# Patient Record
Sex: MALE | Race: WHITE | NOT HISPANIC OR LATINO | Employment: FULL TIME | URBAN - METROPOLITAN AREA
[De-identification: names, ages, dates, MRNs, and addresses within clinical notes are randomized per-mention and may not be internally consistent; named-entity substitution may affect disease eponyms.]

---

## 2017-06-28 ENCOUNTER — NURSE TRIAGE (OUTPATIENT)
Dept: NURSING | Facility: CLINIC | Age: 42
End: 2017-06-28

## 2017-06-28 ENCOUNTER — TELEPHONE (OUTPATIENT)
Dept: FAMILY MEDICINE | Facility: CLINIC | Age: 42
End: 2017-06-28

## 2017-06-28 NOTE — TELEPHONE ENCOUNTER
Patient calling with chest pain, blood pressure issues. Triage not available in clinic, call transferred to FNA as advised.

## 2017-06-28 NOTE — TELEPHONE ENCOUNTER
"  Reason for Disposition    Numbness (i.e., loss of sensation) in hand or fingers    Additional Information    Negative: Shock suspected (e.g., cold/pale/clammy skin, too weak to stand, low BP, rapid pulse)    Negative: [1] Similar pain previously AND [2] it was from \"angina\" AND [3] not relieved by nitroglycerin    Negative: [1] Similar pain previously AND [2] it was from \"heart attack\"    Negative: Sounds like a life-threatening emergency to the triager    Negative: Followed an arm injury    Negative: Chest pain    Negative: Pain, redness, or swelling at intravenous (IV) site or along course of vein    Negative: Wound looks infected    Negative: Elbow pain is main symptom    Negative: Wrist pain is main symptom    Negative: Difficulty breathing or unusual sweating (e.g., sweating without exertion)    Negative: [1] Age > 40 AND [2] associated chest or jaw pain AND [3] pain lasts > 5 minutes    Negative: [1] Age > 40 AND [2] no obvious cause AND [3] pain even when not moving the arm    (Exception: pain is clearly made worse by moving arm or bending neck)    Negative: [1] SEVERE pain AND [2] not improved 2 hours after pain medicine    Negative: [1] Red area or streak AND [2] fever    Negative: [1] Swollen joint AND [2] fever    Negative: Patient sounds very sick or weak to the triager    Negative: [1] Red area or streak AND [2] large (> 2 in. or 5 cm)    Negative: Entire arm is swollen    Negative: [1] Cast on wrist or arm AND [2] now increased pain    Negative: Weakness (i.e., loss of strength) in hand or fingers     (Exception: not truly weak; hand feels weak because of pain)    Negative: [1] Arm pains with exertion (e.g., walking) AND [2] pain goes away on resting AND [3] not present now    Negative: [1] Painful rash AND [2] multiple small blisters grouped together (i.e., dermatomal distribution or \"band\" or \"stripe\")    Negative: Looks like a boil, infected sore, deep ulcer or other infected rash (spreading " redness, pus)    Negative: [1] Localized rash is very painful AND [2] no fever    Negative: Localized pain, redness or hard lump along vein    Protocols used: ARM PAIN-ADULT-AH

## 2017-06-30 ENCOUNTER — RADIANT APPOINTMENT (OUTPATIENT)
Dept: GENERAL RADIOLOGY | Facility: CLINIC | Age: 42
End: 2017-06-30
Attending: NURSE PRACTITIONER
Payer: COMMERCIAL

## 2017-06-30 ENCOUNTER — OFFICE VISIT (OUTPATIENT)
Dept: FAMILY MEDICINE | Facility: CLINIC | Age: 42
End: 2017-06-30
Payer: COMMERCIAL

## 2017-06-30 VITALS
DIASTOLIC BLOOD PRESSURE: 98 MMHG | HEIGHT: 70 IN | WEIGHT: 238.6 LBS | HEART RATE: 69 BPM | BODY MASS INDEX: 34.16 KG/M2 | TEMPERATURE: 98.3 F | OXYGEN SATURATION: 97 % | SYSTOLIC BLOOD PRESSURE: 150 MMHG

## 2017-06-30 DIAGNOSIS — N18.30 CKD (CHRONIC KIDNEY DISEASE) STAGE 3, GFR 30-59 ML/MIN (H): ICD-10-CM

## 2017-06-30 DIAGNOSIS — I10 BENIGN ESSENTIAL HYPERTENSION: ICD-10-CM

## 2017-06-30 DIAGNOSIS — F41.0 PANIC ATTACK: ICD-10-CM

## 2017-06-30 DIAGNOSIS — K21.9 GASTROESOPHAGEAL REFLUX DISEASE WITHOUT ESOPHAGITIS: ICD-10-CM

## 2017-06-30 DIAGNOSIS — E78.5 HYPERLIPIDEMIA LDL GOAL <130: Primary | ICD-10-CM

## 2017-06-30 DIAGNOSIS — R07.9 LEFT SIDED CHEST PAIN: ICD-10-CM

## 2017-06-30 LAB
ANION GAP SERPL CALCULATED.3IONS-SCNC: 8 MMOL/L (ref 3–14)
BUN SERPL-MCNC: 23 MG/DL (ref 7–30)
CALCIUM SERPL-MCNC: 8.8 MG/DL (ref 8.5–10.1)
CHLORIDE SERPL-SCNC: 104 MMOL/L (ref 94–109)
CHOLEST SERPL-MCNC: 317 MG/DL
CO2 SERPL-SCNC: 29 MMOL/L (ref 20–32)
CREAT SERPL-MCNC: 1.65 MG/DL (ref 0.66–1.25)
CREAT UR-MCNC: 67 MG/DL
GFR SERPL CREATININE-BSD FRML MDRD: 46 ML/MIN/1.7M2
GLUCOSE SERPL-MCNC: 70 MG/DL (ref 70–99)
HDLC SERPL-MCNC: 52 MG/DL
HGB BLD-MCNC: 16.1 G/DL (ref 13.3–17.7)
LDLC SERPL CALC-MCNC: 230 MG/DL
MICROALBUMIN UR-MCNC: 682 MG/L
MICROALBUMIN/CREAT UR: 1016.39 MG/G CR (ref 0–17)
NONHDLC SERPL-MCNC: 265 MG/DL
POTASSIUM SERPL-SCNC: 4.3 MMOL/L (ref 3.4–5.3)
SODIUM SERPL-SCNC: 141 MMOL/L (ref 133–144)
TRIGL SERPL-MCNC: 174 MG/DL

## 2017-06-30 PROCEDURE — 85018 HEMOGLOBIN: CPT | Performed by: NURSE PRACTITIONER

## 2017-06-30 PROCEDURE — 36415 COLL VENOUS BLD VENIPUNCTURE: CPT | Performed by: NURSE PRACTITIONER

## 2017-06-30 PROCEDURE — 99214 OFFICE O/P EST MOD 30 MIN: CPT | Performed by: NURSE PRACTITIONER

## 2017-06-30 PROCEDURE — 82043 UR ALBUMIN QUANTITATIVE: CPT | Performed by: NURSE PRACTITIONER

## 2017-06-30 PROCEDURE — 93000 ELECTROCARDIOGRAM COMPLETE: CPT | Performed by: NURSE PRACTITIONER

## 2017-06-30 PROCEDURE — 71101 X-RAY EXAM UNILAT RIBS/CHEST: CPT | Mod: LT

## 2017-06-30 PROCEDURE — 80048 BASIC METABOLIC PNL TOTAL CA: CPT | Performed by: NURSE PRACTITIONER

## 2017-06-30 PROCEDURE — 80061 LIPID PANEL: CPT | Performed by: NURSE PRACTITIONER

## 2017-06-30 RX ORDER — LISINOPRIL 10 MG/1
10 TABLET ORAL DAILY
Qty: 90 TABLET | Refills: 1 | Status: SHIPPED | OUTPATIENT
Start: 2017-06-30 | End: 2017-10-23

## 2017-06-30 RX ORDER — CLONAZEPAM 0.5 MG/1
0.25-0.5 TABLET ORAL 2 TIMES DAILY PRN
Qty: 20 TABLET | Refills: 0 | Status: SHIPPED | OUTPATIENT
Start: 2017-06-30 | End: 2017-10-23

## 2017-06-30 RX ORDER — SIMVASTATIN 40 MG
40 TABLET ORAL AT BEDTIME
Qty: 90 TABLET | Refills: 1 | Status: SHIPPED | OUTPATIENT
Start: 2017-06-30 | End: 2017-10-23

## 2017-06-30 NOTE — MR AVS SNAPSHOT
After Visit Summary   6/30/2017    Ilia Arnold    MRN: [id number]           Patient Information     Date Of Birth          1975        Visit Information        Provider Department      6/30/2017 10:20 AM Tatum Post NP [facility]        Today's Diagnoses     Hyperlipidemia LDL goal <130    -  1    Benign essential hypertension        CKD (chronic kidney disease) stage 3, GFR 30-59 ml/min        Left sided chest pain        Gastroesophageal reflux disease without esophagitis        Panic attack          Care Instructions    EKG of heart is normal. Take medication as prescribed. Follow up if your chest pain symptoms persist or worsen as we discussed, or if you have anxiety on a regular basis. I will let yo know your lab/ imaging results.       Tips to Control Acid Reflux    To control acid reflux, you ll need to make some basic diet and lifestyle changes. The simple steps outlined below may be all you ll need to ease discomfort.  Watch what you eat    Avoid fatty foods and spicy foods.    Eat fewer acidic foods, such as citrus and tomato-based foods. These can increase symptoms.    Limit drinking alcohol, caffeine, and fizzy beverages. All increase acid reflux.    Try limiting chocolate, peppermint, and spearmint. These can worsen acid reflux in some people.  Watch when you eat    Avoid lying down for 3 hours after eating.    Do not snack before going to bed.  Raise your head  Raising your head and upper body by 4 to 6 inches helps limit reflux when you re lying down. Put blocks under the head of your bed frame to raise it.  Other changes    Lose weight, if you need to    Don t exercise near bedtime    Avoid tight-fitting clothes    Limit aspirin and ibuprofen    Stop smoking   Date Last Reviewed: 7/1/2016 2000-2017 [employer]. [address] [place] [address], [place] [address]. All rights reserved. This information is not intended as a substitute for professional  medical care. Always follow your healthcare professional's instructions.        Anxiety Reaction  Anxiety is the feeling we all get when we think something bad might happen. It is a normal response to stress and usually causes only a mild reaction. When anxiety becomes more severe, it can interfere with daily life. In some cases, you may not even be aware of what it is you re anxious about. There may also be a genetic link or it may be a learned behavior in the home.  Both psychological and physical triggers cause stress reaction. It's often a response to fear or emotional stress, real or imagined. This stress may come from home, family, work, or social relationships.  During an anxiety reaction, you may feel:    Helpless    Nervous    Depressed    Irritable  Your body may show signs of anxiety in many ways. You may experience:    Dry mouth    Shakiness    Dizziness    Weakness    Trouble breathing    Breathing fast (hyperventilating)    Chest pressure    Sweating    Headache    Nausea    Diarrhea    Tiredness    Inability to sleep    Sexual problems  Home care    Try to locate the sources of stress in your life. They may not be obvious. These may include:    Daily hassles of life (traffic jams, missed appointments, car troubles, etc.)    Major life changes, both good (new baby, job promotion) and bad (loss of job, loss of loved one)    Overload: feeling that you have too many responsibilities and can't take care of all of them at once    Feeling helpless, feeling that your problems are beyond what you re able to solve    Notice how your body reacts to stress. Learn to listen to your body signals. This will help you take action before the stress becomes severe.    When you can, do something about the source of your stress. (Avoid hassles, limit the amount of change that happens in your life at one time and take a break when you feel overloaded).    Unfortunately, many stressful situations can't be avoided. It is  necessary to learn how to better manage stress. There are many proven methods that will reduce your anxiety. These include simple things like exercise, good nutrition and adequate rest. Also, there are certain techniques that are helpful:    Relaxation    Breathing exercises    Visualization    Biofeedback    Meditation  For more information about this, consult your doctor or go to a local bookstore and review the many books and tapes available on this subject.  Follow-up care  If you feel that your anxiety is not responding to self-help measures, contact your doctor or make an appointment with a counselor. You may need short-term psychological counseling and temporary medicine to help you manage stress.  Call 911  Call your healthcare provider right away if any of these occur:    Trouble breathing    Confusion    Drowsiness or trouble wakening    Fainting or loss of consciousness    Rapid heart rate    Seizure    New chest pain that becomes more severe, lasts longer, or spreads into your shoulder, arm, neck, jaw, or back  When to seek medical advice  Call your healthcare provider right away if any of these occur:    Your symptoms get worse    Severe headache not relieved by rest and mild pain reliever  Date Last Reviewed: 9/29/2015 2000-2017 [employer]. [address] [place] [address]. All rights reserved. This information is not intended as a substitute for professional medical care. Always follow your healthcare professional's instructions.         *CHEST PAIN, UNCERTAIN CAUSE    Based on your exam today, the exact cause of your chest pain is not certain. Your condition does not seem serious at this time, and your pain does not appear to be coming from your heart. However, sometimes the signs of a serious problem take more time to appear. Therefore, watch for the warning signs listed below.  HOME CARE:  1. Rest today and avoid strenuous activity.  2. Take any prescribed medicine as  "directed.  FOLLOW UP with your doctor in 1-3 days.   GET PROMPT MEDICAL ATTENTION if any of the following occur:    A change in the type of pain: if it feels different, becomes more severe, lasts longer, or begins to spread into your shoulder, arm, neck, jaw or back    Shortness of breath or increased pain with breathing    Weakness, dizziness, or fainting    Cough with blood or dark colored sputum (phlegm)    Fever over 101  F (38.3  C)    Swelling, pain or redness in one leg    4882-8565 [facility], [address] [place] [address]. All rights reserved. This information is not intended as a substitute for professional medical care. Always follow your healthcare professional's instructions.            Follow-ups after your visit        Follow-up notes from your care team     Return if symptoms worsen or fail to improve.      Who to contact     Normal or non-critical lab and imaging results will be communicated to you by MyChart, letter or phone within 4 business days after the clinic has received the results. If you do not hear from us within 7 days, please contact the clinic through MyChart or phone. If you have a critical or abnormal lab result, we will notify you by phone as soon as possible.  Submit refill requests through [employer] or call your pharmacy and they will forward the refill request to us. Please allow 3 business days for your refill to be completed.          If you need to speak with a  for additional information , please call: [phone]             Additional Information About Your Visit        Care EveryWhere ID     This is your Care EveryWhere ID. This could be used by other organizations to access your [facility] medical records  [id number]        Your Vitals Were     Pulse Temperature Height Pulse Oximetry BMI (Body Mass Index)       69 98.3  F (36.8  C) (Oral) 5' 9.69\" (1.77 m) 97% 34.55 kg/m2        Blood Pressure from Last 3 Encounters:   06/30/17 (!) 150/98 "   05/04/16 118/73   11/20/15 119/74    Weight from Last 3 Encounters:   06/30/17 238 lb 9.6 oz (108.2 kg)   05/04/16 208 lb 6.4 oz (94.5 kg)   11/20/15 252 lb 6.4 oz (114.5 kg)              We Performed the Following     Albumin Random Urine Quantitative     Basic metabolic panel  (Ca, Cl, CO2, Creat, Gluc, K, Na, BUN)     EKG 12-lead complete w/read - Clinics     Hemoglobin     Lipid panel reflex to direct LDL          Today's Medication Changes          These changes are accurate as of: 6/30/17 11:11 AM.  If you have any questions, ask your nurse or doctor.               Start taking these medicines.        Dose/Directions    clonazePAM 0.5 MG tablet   Commonly known as:  klonoPIN   Used for:  Panic attack   Started by:  Tatum Post NP        Dose:  0.25-0.5 mg   Take 0.5-1 tablets (0.25-0.5 mg) by mouth 2 times daily as needed for other (For panic attack only)   Quantity:  20 tablet   Refills:  0       lisinopril 10 MG tablet   Commonly known as:  PRINIVIL/ZESTRIL   Used for:  Benign essential hypertension   Started by:  Tatum Post NP        Dose:  10 mg   Take 1 tablet (10 mg) by mouth daily   Quantity:  90 tablet   Refills:  1       omeprazole 20 MG CR capsule   Commonly known as:  priLOSEC   Used for:  Gastroesophageal reflux disease without esophagitis   Started by:  Tatum Post NP        Dose:  20 mg   Take 1 capsule (20 mg) by mouth daily   Quantity:  90 capsule   Refills:  1       simvastatin 40 MG tablet   Commonly known as:  ZOCOR   Used for:  Benign essential hypertension, CKD (chronic kidney disease) stage 3, GFR 30-59 ml/min, Hyperlipidemia LDL goal <130   Started by:  Tatum Post NP        Dose:  40 mg   Take 1 tablet (40 mg) by mouth At Bedtime   Quantity:  90 tablet   Refills:  1            Where to get your medicines      These medications were sent to [facility] - [address] [facility]  [address] [facility] [address]     Phone:   [phone]     lisinopril 10 MG tablet    omeprazole 20 MG CR capsule    simvastatin 40 MG tablet         Some of these will need a paper prescription and others can be bought over the counter.  Ask your nurse if you have questions.     Bring a paper prescription for each of these medications     clonazePAM 0.5 MG tablet                Primary Care Provider Office Phone #    Tim [place] [facility] [phone]       No address on file        Equal Access to Services     DIANA DORMAN : Hadii aad ku hadasho Soomaali, waaxda luqadaha, qaybta kaalmada adeegyada, waxay idiin hayrobn adevicenta atkinson laBurtpankaj martin. So [facility] [phone].    ATENCIÓN: Si habla tay, tiene a mancia disposición servicios gratuitos de asistencia lingüística. Llame al [phone].    We comply with applicable federal civil rights laws and [place] laws. We do not discriminate on the basis of race, color, national origin, age, disability sex, sexual orientation or gender identity.            Thank you!     Thank you for choosing [facility]  for your care. Our goal is always to provide you with excellent care. Hearing back from our patients is one way we can continue to improve our services. Please take a few minutes to complete the written survey that you may receive in the mail after your visit with us. Thank you!             Your Updated Medication List - Protect others around you: Learn how to safely use, store and throw away your medicines at www.disposemymeds.org.          This list is accurate as of: 6/30/17 11:11 AM.  Always use your most recent med list.                   Brand Name Dispense Instructions for use Diagnosis    clonazePAM 0.5 MG tablet    klonoPIN    20 tablet    Take 0.5-1 tablets (0.25-0.5 mg) by mouth 2 times daily as needed for other (For panic attack only)    Panic attack       lisinopril 10 MG tablet    PRINIVIL/ZESTRIL    90 tablet    Take 1 tablet (10 mg) by mouth daily    Benign essential hypertension        omeprazole 20 MG CR capsule    priLOSEC    90 capsule    Take 1 capsule (20 mg) by mouth daily    Gastroesophageal reflux disease without esophagitis       simvastatin 40 MG tablet    ZOCOR    90 tablet    Take 1 tablet (40 mg) by mouth At Bedtime    Benign essential hypertension, CKD (chronic kidney disease) stage 3, GFR 30-59 ml/min, Hyperlipidemia LDL goal <130

## 2017-06-30 NOTE — LETTER
[facility]  [address] [facility] [place] [address]  [phone]        June 30, 2017      Ilia Arnold  [address] [place] [address]        Dear Ilia,      Normal chest xray.    Results for orders placed or performed in visit on 06/30/17   XR Ribs & Chest Left G/E 3 Views    Narrative    LEFT RIBS AND CHEST THREE VIEWS  6/30/2017 11:05 AM     HISTORY: Chest pain, unspecified.     COMPARISON: Chest x-ray 11/7/2012.      Impression    IMPRESSION: Single view of the chest is performed with dedicated views  of the left ribs. Lungs are clear. Heart is normal in size. No  pneumothorax or pleural effusions. Dedicated views of the left ribs  show no evidence of fracture.    MERLY BARRERA MD     If you have any questions or concerns, please call myself or my nurse at [phone].    Sincerely,    YAMILETH Tineo/yuliya

## 2017-06-30 NOTE — PATIENT INSTRUCTIONS
EKG of heart is normal. Take medication as prescribed. Follow up if your chest pain symptoms persist or worsen as we discussed, or if you have anxiety on a regular basis. I will let you know your lab/ imaging results.       Tips to Control Acid Reflux    To control acid reflux, you ll need to make some basic diet and lifestyle changes. The simple steps outlined below may be all you ll need to ease discomfort.  Watch what you eat    Avoid fatty foods and spicy foods.    Eat fewer acidic foods, such as citrus and tomato-based foods. These can increase symptoms.    Limit drinking alcohol, caffeine, and fizzy beverages. All increase acid reflux.    Try limiting chocolate, peppermint, and spearmint. These can worsen acid reflux in some people.  Watch when you eat    Avoid lying down for 3 hours after eating.    Do not snack before going to bed.  Raise your head  Raising your head and upper body by 4 to 6 inches helps limit reflux when you re lying down. Put blocks under the head of your bed frame to raise it.  Other changes    Lose weight, if you need to    Don t exercise near bedtime    Avoid tight-fitting clothes    Limit aspirin and ibuprofen    Stop smoking   Date Last Reviewed: 7/1/2016 2000-2017 [employer]. [address] [place] [address], [place] [address]. All rights reserved. This information is not intended as a substitute for professional medical care. Always follow your healthcare professional's instructions.        Anxiety Reaction  Anxiety is the feeling we all get when we think something bad might happen. It is a normal response to stress and usually causes only a mild reaction. When anxiety becomes more severe, it can interfere with daily life. In some cases, you may not even be aware of what it is you re anxious about. There may also be a genetic link or it may be a learned behavior in the home.  Both psychological and physical triggers cause stress reaction. It's often a response to fear or  emotional stress, real or imagined. This stress may come from home, family, work, or social relationships.  During an anxiety reaction, you may feel:    Helpless    Nervous    Depressed    Irritable  Your body may show signs of anxiety in many ways. You may experience:    Dry mouth    Shakiness    Dizziness    Weakness    Trouble breathing    Breathing fast (hyperventilating)    Chest pressure    Sweating    Headache    Nausea    Diarrhea    Tiredness    Inability to sleep    Sexual problems  Home care    Try to locate the sources of stress in your life. They may not be obvious. These may include:    Daily hassles of life (traffic jams, missed appointments, car troubles, etc.)    Major life changes, both good (new baby, job promotion) and bad (loss of job, loss of loved one)    Overload: feeling that you have too many responsibilities and can't take care of all of them at once    Feeling helpless, feeling that your problems are beyond what you re able to solve    Notice how your body reacts to stress. Learn to listen to your body signals. This will help you take action before the stress becomes severe.    When you can, do something about the source of your stress. (Avoid hassles, limit the amount of change that happens in your life at one time and take a break when you feel overloaded).    Unfortunately, many stressful situations can't be avoided. It is necessary to learn how to better manage stress. There are many proven methods that will reduce your anxiety. These include simple things like exercise, good nutrition and adequate rest. Also, there are certain techniques that are helpful:    Relaxation    Breathing exercises    Visualization    Biofeedback    Meditation  For more information about this, consult your doctor or go to a local bookstore and review the many books and tapes available on this subject.  Follow-up care  If you feel that your anxiety is not responding to self-help measures, contact your doctor  or make an appointment with a counselor. You may need short-term psychological counseling and temporary medicine to help you manage stress.  Call 911  Call your healthcare provider right away if any of these occur:    Trouble breathing    Confusion    Drowsiness or trouble wakening    Fainting or loss of consciousness    Rapid heart rate    Seizure    New chest pain that becomes more severe, lasts longer, or spreads into your shoulder, arm, neck, jaw, or back  When to seek medical advice  Call your healthcare provider right away if any of these occur:    Your symptoms get worse    Severe headache not relieved by rest and mild pain reliever  Date Last Reviewed: 9/29/2015 2000-2017 [employer]. [address] [place] [address]. All rights reserved. This information is not intended as a substitute for professional medical care. Always follow your healthcare professional's instructions.         *CHEST PAIN, UNCERTAIN CAUSE    Based on your exam today, the exact cause of your chest pain is not certain. Your condition does not seem serious at this time, and your pain does not appear to be coming from your heart. However, sometimes the signs of a serious problem take more time to appear. Therefore, watch for the warning signs listed below.  HOME CARE:  1. Rest today and avoid strenuous activity.  2. Take any prescribed medicine as directed.  FOLLOW UP with your doctor in 1-3 days.   GET PROMPT MEDICAL ATTENTION if any of the following occur:    A change in the type of pain: if it feels different, becomes more severe, lasts longer, or begins to spread into your shoulder, arm, neck, jaw or back    Shortness of breath or increased pain with breathing    Weakness, dizziness, or fainting    Cough with blood or dark colored sputum (phlegm)    Fever over 101  F (38.3  C)    Swelling, pain or redness in one leg    1361-1777 [employer], [address] [place] [address]. All rights reserved.  This information is not intended as a substitute for professional medical care. Always follow your healthcare professional's instructions.

## 2017-06-30 NOTE — PROGRESS NOTES
SUBJECTIVE:                                                    Ilia Arnold is a 41 year old male who presents to clinic today for the following health issues:      Hypertension Follow-up      Outpatient blood pressures are not being checked.    Low Salt Diet: not monitoring salt      Amount of exercise or physical activity: None    Problems taking medications regularly: No    Medication side effects: none    Diet: regular (no restrictions)    Patient is also here for arm pit pain. Started 2 month. Left arm pit. Dull pain. Randomly happens. Can radiate into L chest and left posterior shoulder- last a short time and is gone. Did have L jaw pain that he went to dentist for- no cavities.  Does have acid reflux, not on medication. Has gained a lot of weight recently and has a lot of stress; lost baby, lost job; has had a few panic attacks. NO SOB, sweating, nausea with pain.     Also patient states he stopped all medications 2.5 months ago- HTN, cholesterol, etc.     Problem list and histories reviewed & adjusted, as indicated.  Additional history: as documented    Patient Active Problem List   Diagnosis     Erectile dysfunction     Hyperlipidemia LDL goal <130     Renal hypertension     CKD (chronic kidney disease) stage 3, GFR 30-59 ml/min     Proteinuria     Idiopathic chronic gout without tophus, unspecified site     Past Surgical History:   Procedure Laterality Date     NO HISTORY OF SURGERY         Social History   Substance Use Topics     Smoking status: Never Smoker     Smokeless tobacco: Never Used     Alcohol use 2.0 oz/week     4 Standard drinks or equivalent per week      Comment: Periods of heavy etoh use but not routinely.      Family History   Problem Relation Age of Onset     Lipids Mother      C.A.D. Paternal Grandmother 65     cabg     Hypertension Paternal Grandmother      Lipids Brother      CEREBROVASCULAR DISEASE No family hx of      Cancer - colorectal No family hx of      Prostate Cancer No  "family hx of          Current Outpatient Prescriptions   Medication Sig Dispense Refill     lisinopril (PRINIVIL/ZESTRIL) 10 MG tablet Take 1 tablet (10 mg) by mouth daily 90 tablet 1     omeprazole (PRILOSEC) 20 MG CR capsule Take 1 capsule (20 mg) by mouth daily 90 capsule 1     clonazePAM (KLONOPIN) 0.5 MG tablet Take 0.5-1 tablets (0.25-0.5 mg) by mouth 2 times daily as needed for other (For panic attack only) 20 tablet 0     simvastatin (ZOCOR) 40 MG tablet Take 1 tablet (40 mg) by mouth At Bedtime 90 tablet 1     [DISCONTINUED] lisinopril (PRINIVIL,ZESTRIL) 10 MG tablet Take 1 tablet (10 mg) by mouth daily (Patient not taking: Reported on 6/30/2017) 90 tablet 3     [DISCONTINUED] simvastatin (ZOCOR) 40 MG tablet Take 1 tablet (40 mg) by mouth At Bedtime (Patient not taking: Reported on 6/30/2017) 90 tablet 3     No Known Allergies  BP Readings from Last 3 Encounters:   06/30/17 (!) 150/98   05/04/16 118/73   11/20/15 119/74    Wt Readings from Last 3 Encounters:   06/30/17 238 lb 9.6 oz (108.2 kg)   05/04/16 208 lb 6.4 oz (94.5 kg)   11/20/15 252 lb 6.4 oz (114.5 kg)            Labs reviewed in EPIC    Reviewed and updated as needed this visit by clinical staff  Tobacco  Allergies  Meds  Med Hx  Surg Hx  Fam Hx  Soc Hx      Reviewed and updated as needed this visit by Provider         ROS:  Constitutional, HEENT, cardiovascular, pulmonary, GI, , musculoskeletal, neuro, skin, endocrine and psych systems are negative, except as otherwise noted.    OBJECTIVE:     BP (!) 150/98  Pulse 69  Temp 98.3  F (36.8  C) (Oral)  Ht 5' 9.69\" (1.77 m)  Wt 238 lb 9.6 oz (108.2 kg)  SpO2 97%  BMI 34.55 kg/m2  Body mass index is 34.55 kg/(m^2).  GENERAL: healthy, alert and no distress  EYES: Eyes grossly normal to inspection, PERRL and conjunctivae and sclerae normal  HENT: ear canals and TM's normal, nose and mouth without ulcers or lesions  NECK: no adenopathy, no asymmetry, masses, or scars and thyroid normal to " palpation  RESP: lungs clear to auscultation - no rales, rhonchi or wheezes  CV: regular rate and rhythm, normal S1 S2, no S3 or S4, no murmur, click or rub, no peripheral edema and peripheral pulses strong  ABDOMEN: soft, nontender, no hepatosplenomegaly, no masses and bowel sounds normal  MS: no gross musculoskeletal defects noted, no edema, normal ROM of arms, no pain with palpation to L chest wall/ axilla.  No lumps or erythema.   NEURO: Normal strength and tone, mentation intact and speech normal  PSYCH: mentation appears normal, POSITIVE for fast talking, sweaty palms, anxious behaviors.     Diagnostic Test Results:  See orders    ASSESSMENT/PLAN:     Hypertension; much worse   Associated with the following complications:    CKD   Plan:  Labs:   See orders  Medications:     Pt wanting to restart his lisinopril and zocor    Chronic Kidney Disease Stage 3 , considered/addressed related to acute problem     Associated with the following complications: None     Plan:  Meds  Pt wanting to restart his lisinopril and zocor           ICD-10-CM    1. Hyperlipidemia LDL goal <130 E78.5 Lipid panel reflex to direct LDL     simvastatin (ZOCOR) 40 MG tablet   2. Benign essential hypertension I10 Basic metabolic panel  (Ca, Cl, CO2, Creat, Gluc, K, Na, BUN)     Albumin Random Urine Quantitative     lisinopril (PRINIVIL/ZESTRIL) 10 MG tablet     simvastatin (ZOCOR) 40 MG tablet   3. CKD (chronic kidney disease) stage 3, GFR 30-59 ml/min N18.3 Basic metabolic panel  (Ca, Cl, CO2, Creat, Gluc, K, Na, BUN)     Hemoglobin     Albumin Random Urine Quantitative     simvastatin (ZOCOR) 40 MG tablet   4. Left sided chest pain R07.9 XR Ribs & Chest Left G/E 3 Views     EKG 12-lead complete w/read - Clinics    Atypical, under left armpit and left chest   5. Gastroesophageal reflux disease without esophagitis K21.9 omeprazole (PRILOSEC) 20 MG CR capsule   6. Panic attack F41.0 clonazePAM (KLONOPIN) 0.5 MG tablet       See Patient  Instructions: EKG of heart is normal. Take medication as prescribed. Follow up if your chest pain symptoms persist or worsen as we discussed, or if you have anxiety on a regular basis. I will let you know your lab/ imaging results.     Tatum Post, EVELIN  [facility]

## 2017-06-30 NOTE — NURSING NOTE
"Chief Complaint   Patient presents with     Hypertension     Musculoskeletal Problem       Initial BP (!) 162/95  Pulse 69  Temp 98.3  F (36.8  C) (Oral)  Ht 5' 9.69\" (1.77 m)  Wt 238 lb 9.6 oz (108.2 kg)  SpO2 97%  BMI 34.55 kg/m2 Estimated body mass index is 34.55 kg/(m^2) as calculated from the following:    Height as of this encounter: 5' 9.69\" (1.77 m).    Weight as of this encounter: 238 lb 9.6 oz (108.2 kg).  Medication Reconciliation: complete     Yuko Dennison MA  "

## 2017-07-03 PROBLEM — K21.9 GASTROESOPHAGEAL REFLUX DISEASE WITHOUT ESOPHAGITIS: Status: ACTIVE | Noted: 2017-07-03

## 2017-07-03 PROBLEM — I10 BENIGN ESSENTIAL HYPERTENSION: Status: ACTIVE | Noted: 2017-07-03

## 2017-07-03 PROBLEM — F41.0 PANIC ATTACK: Status: ACTIVE | Noted: 2017-07-03

## 2017-07-21 ENCOUNTER — OFFICE VISIT (OUTPATIENT)
Dept: FAMILY MEDICINE | Facility: CLINIC | Age: 42
End: 2017-07-21
Payer: COMMERCIAL

## 2017-07-21 VITALS
HEIGHT: 69 IN | DIASTOLIC BLOOD PRESSURE: 84 MMHG | OXYGEN SATURATION: 98 % | HEART RATE: 68 BPM | RESPIRATION RATE: 18 BRPM | BODY MASS INDEX: 34.66 KG/M2 | SYSTOLIC BLOOD PRESSURE: 132 MMHG | WEIGHT: 234 LBS | TEMPERATURE: 98.6 F

## 2017-07-21 DIAGNOSIS — M1A.00X0 IDIOPATHIC CHRONIC GOUT WITHOUT TOPHUS, UNSPECIFIED SITE: Primary | ICD-10-CM

## 2017-07-21 PROCEDURE — 99213 OFFICE O/P EST LOW 20 MIN: CPT | Performed by: PHYSICIAN ASSISTANT

## 2017-07-21 RX ORDER — PREDNISONE 20 MG/1
TABLET ORAL
Qty: 12 TABLET | Refills: 0 | Status: CANCELLED | OUTPATIENT
Start: 2017-07-21

## 2017-07-21 RX ORDER — ALLOPURINOL 300 MG/1
300 TABLET ORAL DAILY
Qty: 90 TABLET | Refills: 1 | Status: CANCELLED | OUTPATIENT
Start: 2017-07-21

## 2017-07-21 RX ORDER — METHYLPREDNISOLONE 4 MG
TABLET, DOSE PACK ORAL
Qty: 21 TABLET | Refills: 0 | Status: SHIPPED | OUTPATIENT
Start: 2017-07-21 | End: 2017-09-13

## 2017-07-21 NOTE — MR AVS SNAPSHOT
After Visit Summary   7/21/2017    Ilia Arnold    MRN: [id number]           Patient Information     Date Of Birth          1975        Visit Information        Provider Department      7/21/2017 8:40 AM Lazaro Ty PA-C [facility]        Today's Diagnoses     Idiopathic chronic gout without tophus, unspecified site    -  1      Care Instructions      Gout Diet  Gout is a painful condition caused by an excess of uric acid, a waste product made by the body. Uric acid forms crystals that collect in the joints. The immune response to these crystals brings on symptoms of joint pain and swelling. This is called a gout attack. Often, medications and diet changes are combined to manage gout. Below are some guidelines for changing your diet to help you manage gout and prevent attacks. Your health care provider will help you determine the best eating plan for you.     Eating to manage gout  Weight loss for those who are overweight may help reduce gout attacks.  Eat less of these foods  Eating too many foods containing purines may raise the levels of uric acid in your body. This raises your risk for a gout attack. Try to limit these foods and drinks:    Alcohol, such as beer and red wine. You may be told to avoid alcohol completely.    Soft drinks that contain sugar or high fructose corn syrup    Certain fish, including anchovies, sardines, fish eggs, and herring    Shellfish    Certain meats, such as red meat, hot dogs, luncheon meats, and turkey    Organ meats, such as liver, kidneys, and sweetbreads    Legumes, such as dried beans and peas    Other high fat foods such as gravy, whole milk, and high fat cheeses    Vegetables such as asparagus, cauliflower, spinach, and mushrooms used to be thought to contribute to an increased risk for a gout attack, but recent studies show that high purine vegetables don't increase the risk for a gout attack.  Eat more of these foods  Other foods  may be helpful for people with gout. Add some of these foods to your diet:    Cherries contain chemicals that may lower uric acid.    Omega fatty acids. These are found in some fatty fish such as salmon, certain oils (flax, olive, or nut), and nuts themselves. Omega fatty acids may help prevent inflammation due to gout.    Dairy products that are low-fat or fat-free, such as cheese and yogurt    Complex carbohydrate foods, including whole grains, brown rice, oats, and beans    Coffee, in moderation    Water, approximately 64 ounces per day  Follow-up care  Follow up with your healthcare provider as advised.  When to seek medical advice  Call your healthcare provider right away if any of these occur:    Return of gout symptoms, usually at night:    Severe pain, swelling, and heat in a joint, especially the base of the big toe    Affected joint is hard to move    Skin of the affected joint is purple or red    Fever of 100.4 F (38 C) or higher    Pain that doesn't get better even with prescribed medicine   Date Last Reviewed: 1/12/2016 2000-2017 [employer]. [address] [place] [address]. All rights reserved. This information is not intended as a substitute for professional medical care. Always follow your healthcare professional's instructions.        Eating to Prevent Gout  Gout is a painful form of arthritis caused by an excess of uric acid. This is a waste product made by the body. It builds up in the body and forms crystals that collect in the joints, bringing on a gout attack. Alcohol and certain foods can trigger a gout attack. Below are some guidelines for changing your diet to help you manage gout. Your healthcare provider can work with you to determine the best eating plan for you. Know that diet is only one part of managing gout. Take your medicines as prescribed and follow the other guidelines your healthcare provider has given you.  Foods to limit  Eating too many foods  containing purines may increase the levels of uric acid in your body and increase your risk for a gout attack. It may be best to limit these high-purine foods:    Alcohol (beer, red wine). You may be told to avoid alcohol completely.    Certain fish (anchovies, sardines, fish roes, herring, tuna, mussels, codfish, scallops, trout, and jose daniel)    Certain meats (red meat, processed meat, bauer, turkey, wild game, and goose)    Sauces and gravies made with meat    Organ meats (such as liver, kidneys, sweetbreads, and tripe)    Legumes (such as dried beans, peas)    Mushrooms, spinach, asparagus, and cauliflower    Yeast and yeast extract supplements  Foods to try  Some foods may be helpful for people with gout. You may want to try adding some of the following foods to your diet:    Dark berries: These include blueberries, blackberries, and cherries. These berries contain chemicals that may lower uric acid.    Tofu: Tofu, which is made from soy, is a good source of protein. Studies have shown that it may be a better choice than meat for people with gout.    Omega fatty acids: These acids are found in fatty fish (such as salmon), certain oils (such as flax, olive, or nut oils), or nuts. They may help prevent inflammation due to gout.  The following guidelines are recommended by the American Medical Association for people with gout. Your diet should be:    High in fiber, whole grains, fruits, and vegetables.    Low in protein (15% of calories should come from protein. Choose lean sources such as soy, lean meats, and poultry).    Low in fat (no more than 30% of calories should come from fat, with only 10% coming from animal fat).   Date Last Reviewed: 6/17/2015 2000-2017 [employer]. [address] [place] [address], [place] [address]. All rights reserved. This information is not intended as a substitute for professional medical care. Always follow your healthcare professional's instructions.                 "Follow-ups after your visit        Your next 10 appointments already scheduled     Jul 28, 2017  8:45 AM CDT   Nurse Only with [facility]   [facility] [place] [facility]    [address]  [place] [address] [phone]              Who to contact     Normal or non-critical lab and imaging results will be communicated to you by MyChart, letter or phone within 4 business days after the clinic has received the results. If you do not hear from us within 7 days, please contact the clinic through MyChart or phone. If you have a critical or abnormal lab result, we will notify you by phone as soon as possible.  Submit refill requests through [employer] or call your pharmacy and they will forward the refill request to us. Please allow 3 business days for your refill to be completed.          If you need to speak with a  for additional information , please call: [phone]             Additional Information About Your Visit        Care EveryWhere ID     This is your Care EveryWhere ID. This could be used by other organizations to access your [facility] medical records  [id number]        Your Vitals Were     Pulse Temperature Respirations Height Pulse Oximetry BMI (Body Mass Index)    68 98.6  F (37  C) (Tympanic) 18 5' 9\" (1.753 m) 98% 34.56 kg/m2       Blood Pressure from Last 3 Encounters:   07/21/17 132/84   06/30/17 (!) 150/98   05/04/16 118/73    Weight from Last 3 Encounters:   07/21/17 234 lb (106.1 kg)   06/30/17 238 lb 9.6 oz (108.2 kg)   05/04/16 208 lb 6.4 oz (94.5 kg)              Today, you had the following     No orders found for display         Today's Medication Changes          These changes are accurate as of: 7/21/17  9:07 AM.  If you have any questions, ask your nurse or doctor.               Start taking these medicines.        Dose/Directions    methylPREDNISolone 4 MG tablet   Commonly known as:  MEDROL DOSEPAK   Used for:  Idiopathic chronic gout " without tops, unspecified site   Started by:  Lazaro Ty PA-C        Follow package instructions   Quantity:  21 tablet   Refills:  0            Where to get your medicines      These medications were sent to [facility] [id number]  [address] [place] [address]     Phone:  [phone]     methylPREDNISolone 4 MG tablet                Primary Care Provider Office Phone #    [facility] [phone]       No address on file        Equal Access to Services     [place] [facility] : Hadii aad ku hadasho Soomaali, waaxda luqadaha, qaybta kaalmada adeegyada, waxay idiin hayaan adeeg kharash la'aan . So [facility] [phone].    ATENCIÓN: Si habla español, tiene a mancia disposición servicios gratuitos de asistencia lingüística. [facility] [phone].    We comply with applicable federal civil rights laws and [place] laws. We do not discriminate on the basis of race, color, national origin, age, disability sex, sexual orientation or gender identity.            Thank you!     Thank you for choosing [facility]  for your care. Our goal is always to provide you with excellent care. Hearing back from our patients is one way we can continue to improve our services. Please take a few minutes to complete the written survey that you may receive in the mail after your visit with us. Thank you!             Your Updated Medication List - Protect others around you: Learn how to safely use, store and throw away your medicines at www.disposemymeds.org.          This list is accurate as of: 7/21/17  9:07 AM.  Always use your most recent med list.                   Brand Name Dispense Instructions for use Diagnosis    clonazePAM 0.5 MG tablet    klonoPIN    20 tablet    Take 0.5-1 tablets (0.25-0.5 mg) by mouth 2 times daily as needed for other (For panic attack only)    Panic attack       lisinopril 10 MG tablet    PRINIVIL/ZESTRIL    90 tablet    Take 1 tablet  (10 mg) by mouth daily    Benign essential hypertension       methylPREDNISolone 4 MG tablet    MEDROL DOSEPAK    21 tablet    Follow package instructions    Idiopathic chronic gout without tophus, unspecified site       omeprazole 20 MG CR capsule    priLOSEC    90 capsule    Take 1 capsule (20 mg) by mouth daily    Gastroesophageal reflux disease without esophagitis       simvastatin 40 MG tablet    ZOCOR    90 tablet    Take 1 tablet (40 mg) by mouth At Bedtime    Benign essential hypertension, CKD (chronic kidney disease) stage 3, GFR 30-59 ml/min, Hyperlipidemia LDL goal <130

## 2017-07-21 NOTE — PATIENT INSTRUCTIONS
Gout Diet  Gout is a painful condition caused by an excess of uric acid, a waste product made by the body. Uric acid forms crystals that collect in the joints. The immune response to these crystals brings on symptoms of joint pain and swelling. This is called a gout attack. Often, medications and diet changes are combined to manage gout. Below are some guidelines for changing your diet to help you manage gout and prevent attacks. Your health care provider will help you determine the best eating plan for you.     Eating to manage gout  Weight loss for those who are overweight may help reduce gout attacks.  Eat less of these foods  Eating too many foods containing purines may raise the levels of uric acid in your body. This raises your risk for a gout attack. Try to limit these foods and drinks:    Alcohol, such as beer and red wine. You may be told to avoid alcohol completely.    Soft drinks that contain sugar or high fructose corn syrup    Certain fish, including anchovies, sardines, fish eggs, and herring    Shellfish    Certain meats, such as red meat, hot dogs, luncheon meats, and turkey    Organ meats, such as liver, kidneys, and sweetbreads    Legumes, such as dried beans and peas    Other high fat foods such as gravy, whole milk, and high fat cheeses    Vegetables such as asparagus, cauliflower, spinach, and mushrooms used to be thought to contribute to an increased risk for a gout attack, but recent studies show that high purine vegetables don't increase the risk for a gout attack.  Eat more of these foods  Other foods may be helpful for people with gout. Add some of these foods to your diet:    Cherries contain chemicals that may lower uric acid.    Omega fatty acids. These are found in some fatty fish such as salmon, certain oils (flax, olive, or nut), and nuts themselves. Omega fatty acids may help prevent inflammation due to gout.    Dairy products that are low-fat or fat-free, such as cheese and  yogurt    Complex carbohydrate foods, including whole grains, brown rice, oats, and beans    Coffee, in moderation    Water, approximately 64 ounces per day  Follow-up care  Follow up with your healthcare provider as advised.  When to seek medical advice  Call your healthcare provider right away if any of these occur:    Return of gout symptoms, usually at night:    Severe pain, swelling, and heat in a joint, especially the base of the big toe    Affected joint is hard to move    Skin of the affected joint is purple or red    Fever of 100.4 F (38 C) or higher    Pain that doesn't get better even with prescribed medicine   Date Last Reviewed: 1/12/2016 2000-2017 [employer]. [address] [place] [address], [place] [address]. All rights reserved. This information is not intended as a substitute for professional medical care. Always follow your healthcare professional's instructions.        Eating to Prevent Gout  Gout is a painful form of arthritis caused by an excess of uric acid. This is a waste product made by the body. It builds up in the body and forms crystals that collect in the joints, bringing on a gout attack. Alcohol and certain foods can trigger a gout attack. Below are some guidelines for changing your diet to help you manage gout. Your healthcare provider can work with you to determine the best eating plan for you. Know that diet is only one part of managing gout. Take your medicines as prescribed and follow the other guidelines your healthcare provider has given you.  Foods to limit  Eating too many foods containing purines may increase the levels of uric acid in your body and increase your risk for a gout attack. It may be best to limit these high-purine foods:    Alcohol (beer, red wine). You may be told to avoid alcohol completely.    Certain fish (anchovies, sardines, fish roes, herring, tuna, mussels, codfish, scallops, trout, and jose daniel)    Certain meats (red meat, processed meat, bauer,  turkey, wild game, and goose)    Sauces and gravies made with meat    Organ meats (such as liver, kidneys, sweetbreads, and tripe)    Legumes (such as dried beans, peas)    Mushrooms, spinach, asparagus, and cauliflower    Yeast and yeast extract supplements  Foods to try  Some foods may be helpful for people with gout. You may want to try adding some of the following foods to your diet:    Dark berries: These include blueberries, blackberries, and cherries. These berries contain chemicals that may lower uric acid.    Tofu: Tofu, which is made from soy, is a good source of protein. Studies have shown that it may be a better choice than meat for people with gout.    Omega fatty acids: These acids are found in fatty fish (such as salmon), certain oils (such as flax, olive, or nut oils), or nuts. They may help prevent inflammation due to gout.  The following guidelines are recommended by the American Medical Association for people with gout. Your diet should be:    High in fiber, whole grains, fruits, and vegetables.    Low in protein (15% of calories should come from protein. Choose lean sources such as soy, lean meats, and poultry).    Low in fat (no more than 30% of calories should come from fat, with only 10% coming from animal fat).   Date Last Reviewed: 6/17/2015 2000-2017 [employer]. [address] [place] [address]. All rights reserved. This information is not intended as a substitute for professional medical care. Always follow your healthcare professional's instructions.

## 2017-07-21 NOTE — PROGRESS NOTES
SUBJECTIVE:                                                    Ilia Arnold is a 41 year old male who presents to clinic today for the following health issues:      Gout/ single inflamed joint   Onset: today    Description:   Location: big toe - right  Joint Swelling: YES  Redness: YES  Pain: YES    Intensity: moderate    Progression of Symptoms:  worsening    Accompanying Signs & Symptoms:  Fevers: no     History:   Trauma to the area: no   Previous history of gout: YES   Recent illness:  no     Precipitating factors:   Diet-rich in purine: red meat and alcohol  Alcohol use: YES  Diuretic use: no     Alleviating factors:      Therapies Tried and outcome: none, allopurinol and colchicine            Problem list and histories reviewed & adjusted, as indicated.  Additional history: as documented        Reviewed and updated as needed this visit by clinical staff  Allergies  Meds       Reviewed and updated as needed this visit by Provider        All other systems negative except as outline above  OBJECTIVE:  Right foot: right swollen tender 1st mtp joint.   Ilia was seen today for arthritis.    Diagnoses and all orders for this visit:    Idiopathic chronic gout without tophus, unspecified site  -     methylPREDNISolone (MEDROL DOSEPAK) 4 MG tablet; Follow package instructions    Other orders  -     Cancel: predniSONE (DELTASONE) 20 MG tablet; Three tablets days 1-2.  Two tablets days 3-4. One tablet days 5-6.  -     Cancel: Uric acid  -     Cancel: allopurinol (ZYLOPRIM) 300 MG tablet; Take 1 tablet (300 mg) by mouth daily      Advised supportive and symptomatic treatment.  Follow up with Provider - if condition persists or worsens.

## 2017-09-13 ENCOUNTER — TELEPHONE (OUTPATIENT)
Dept: NEPHROLOGY | Facility: CLINIC | Age: 42
End: 2017-09-13

## 2017-10-17 DIAGNOSIS — N18.30 CKD (CHRONIC KIDNEY DISEASE) STAGE 3, GFR 30-59 ML/MIN (H): Primary | ICD-10-CM

## 2017-10-23 ENCOUNTER — OFFICE VISIT (OUTPATIENT)
Dept: NEPHROLOGY | Facility: CLINIC | Age: 42
End: 2017-10-23
Payer: COMMERCIAL

## 2017-10-23 VITALS
SYSTOLIC BLOOD PRESSURE: 146 MMHG | DIASTOLIC BLOOD PRESSURE: 97 MMHG | OXYGEN SATURATION: 98 % | WEIGHT: 247.3 LBS | BODY MASS INDEX: 36.52 KG/M2 | HEART RATE: 67 BPM

## 2017-10-23 DIAGNOSIS — I10 BENIGN ESSENTIAL HYPERTENSION: ICD-10-CM

## 2017-10-23 DIAGNOSIS — N18.30 CKD (CHRONIC KIDNEY DISEASE) STAGE 3, GFR 30-59 ML/MIN (H): ICD-10-CM

## 2017-10-23 DIAGNOSIS — E78.5 HYPERLIPIDEMIA LDL GOAL <130: ICD-10-CM

## 2017-10-23 DIAGNOSIS — N18.30 CKD (CHRONIC KIDNEY DISEASE) STAGE 3, GFR 30-59 ML/MIN (H): Primary | ICD-10-CM

## 2017-10-23 LAB
ALBUMIN SERPL-MCNC: 3.7 G/DL (ref 3.4–5)
ALBUMIN UR-MCNC: 100 MG/DL
ANION GAP SERPL CALCULATED.3IONS-SCNC: 3 MMOL/L (ref 3–14)
APPEARANCE UR: CLEAR
BILIRUB UR QL STRIP: NEGATIVE
BUN SERPL-MCNC: 18 MG/DL (ref 7–30)
CALCIUM SERPL-MCNC: 9 MG/DL (ref 8.5–10.1)
CHLORIDE SERPL-SCNC: 107 MMOL/L (ref 94–109)
CO2 SERPL-SCNC: 29 MMOL/L (ref 20–32)
COLOR UR AUTO: ABNORMAL
CREAT SERPL-MCNC: 1.73 MG/DL (ref 0.66–1.25)
CREAT UR-MCNC: 85 MG/DL
GFR SERPL CREATININE-BSD FRML MDRD: 44 ML/MIN/1.7M2
GLUCOSE SERPL-MCNC: 90 MG/DL (ref 70–99)
GLUCOSE UR STRIP-MCNC: NEGATIVE MG/DL
HGB BLD-MCNC: 15.5 G/DL (ref 13.3–17.7)
HGB UR QL STRIP: NEGATIVE
KETONES UR STRIP-MCNC: NEGATIVE MG/DL
LEUKOCYTE ESTERASE UR QL STRIP: NEGATIVE
NITRATE UR QL: NEGATIVE
PH UR STRIP: 6 PH (ref 5–7)
PHOSPHATE SERPL-MCNC: 2.8 MG/DL (ref 2.5–4.5)
POTASSIUM SERPL-SCNC: ABNORMAL MMOL/L (ref 3.4–5.3)
PROT UR-MCNC: 1.12 G/L
PROT/CREAT 24H UR: 1.32 G/G CR (ref 0–0.2)
PTH-INTACT SERPL-MCNC: 101 PG/ML (ref 12–72)
RBC #/AREA URNS AUTO: ABNORMAL /HPF
SODIUM SERPL-SCNC: 139 MMOL/L (ref 133–144)
SOURCE: ABNORMAL
SP GR UR STRIP: 1.01 (ref 1–1.03)
UROBILINOGEN UR STRIP-MCNC: NORMAL MG/DL (ref 0–2)
WBC #/AREA URNS AUTO: ABNORMAL /HPF

## 2017-10-23 PROCEDURE — 36415 COLL VENOUS BLD VENIPUNCTURE: CPT | Performed by: INTERNAL MEDICINE

## 2017-10-23 PROCEDURE — 80069 RENAL FUNCTION PANEL: CPT | Performed by: INTERNAL MEDICINE

## 2017-10-23 PROCEDURE — 84156 ASSAY OF PROTEIN URINE: CPT | Performed by: INTERNAL MEDICINE

## 2017-10-23 PROCEDURE — 83970 ASSAY OF PARATHORMONE: CPT | Performed by: INTERNAL MEDICINE

## 2017-10-23 PROCEDURE — 85018 HEMOGLOBIN: CPT | Performed by: INTERNAL MEDICINE

## 2017-10-23 PROCEDURE — 81001 URINALYSIS AUTO W/SCOPE: CPT | Performed by: INTERNAL MEDICINE

## 2017-10-23 PROCEDURE — 99214 OFFICE O/P EST MOD 30 MIN: CPT | Performed by: INTERNAL MEDICINE

## 2017-10-23 RX ORDER — LISINOPRIL 10 MG/1
10 TABLET ORAL DAILY
Qty: 90 TABLET | Refills: 3 | Status: SHIPPED | OUTPATIENT
Start: 2017-10-23 | End: 2017-10-23

## 2017-10-23 RX ORDER — LOSARTAN POTASSIUM 25 MG/1
25 TABLET ORAL DAILY
Qty: 90 TABLET | Refills: 3 | Status: SHIPPED | OUTPATIENT
Start: 2017-10-23 | End: 2018-09-18

## 2017-10-23 RX ORDER — SIMVASTATIN 40 MG
40 TABLET ORAL AT BEDTIME
Qty: 90 TABLET | Refills: 3 | Status: SHIPPED | OUTPATIENT
Start: 2017-10-23 | End: 2018-10-10

## 2017-10-23 NOTE — MR AVS SNAPSHOT
After Visit Summary   10/23/2017    Ilia Arnold    MRN: [id number]           Patient Information     Date Of Birth          1975        Visit Information        Provider Department      10/23/2017 4:30 PM Garret Main MD [facility]        Today's Diagnoses     CKD (chronic kidney disease) stage 3, GFR 30-59 ml/min    -  1    Benign essential hypertension        Hyperlipidemia LDL goal <130           Follow-ups after your visit        Your next 10 appointments already scheduled     Jan 22, 2018  4:00 PM CST   LAB with LAB FIRST FLOOR [facility] ([facility])    [address] [place] [id number] [phone]           Patient must bring picture ID. Patient should be prepared to give a urine specimen  Please do not eat 10-12 hours before your appointment if you are coming in fasting for labs on lipids, cholesterol, or glucose (sugar). Pregnant women should follow their Care Team instructions. Water with medications is okay. Do not drink coffee or other fluids. If you have concerns about taking  your medications, please ask at office or if scheduling via [employer], send a message by clicking on Secure Messaging, Message Your Care Team.            Jan 22, 2018  4:30 PM CST   Return Visit with Garret Main MD   [facility] ([facility]    [address] [place] [id number] [phone]              Who to contact     If you have questions or need follow up information about today's clinic visit or your schedule please contact [facility] directly at [phone].  Normal or non-critical lab and imaging results will be communicated to you by MyChart, letter or phone within 4 business days after the clinic has received the results. If you do not hear from us within 7 days, please contact the clinic through MyChart or phone. If you have a critical  or abnormal lab result, we will notify you by phone as soon as possible.  Submit refill requests through [employer] or call your pharmacy and they will forward the refill request to us. Please allow 3 business days for your refill to be completed.          Additional Information About Your Visit        Imgur Information     Imgur is an electronic gateway that provides easy, online access to your medical records. With Imgur, you can request a clinic appointment, read your test results, renew a prescription or communicate with your care team.     To sign up for Imgur visit the website at [email or web address]   You will be asked to enter the access code listed below, as well as some personal information. Please follow the directions to create your username and password.     Your access code is: [email or web address]  Expires: 2018  4:49 PM     Your access code will  in 90 days. If you need help or a new code, please contact your [facility] or call [phone] for assistance.        Care EveryWhere ID     This is your Care EveryWhere ID. This could be used by other organizations to access your [place] medical records  [id number]        Your Vitals Were     Pulse Pulse Oximetry BMI (Body Mass Index)             67 98% 36.52 kg/m2          Blood Pressure from Last 3 Encounters:   10/23/17 (!) 146/97   17 132/84   17 (!) 150/98    Weight from Last 3 Encounters:   10/23/17 112.2 kg (247 lb 4.8 oz)   17 106.1 kg (234 lb)   17 108.2 kg (238 lb 9.6 oz)              We Performed the Following     UA with Microscopic reflex to Culture          Today's Medication Changes          These changes are accurate as of: 10/23/17  4:49 PM.  If you have any questions, ask your nurse or doctor.               Start taking these medicines.        Dose/Directions    losartan 25 MG tablet   Commonly known as:  COZAAR   Used for:  Benign essential hypertension   Started  by:  Garret Main MD        Dose:  25 mg   Take 1 tablet (25 mg) by mouth daily   Quantity:  90 tablet   Refills:  3         Stop taking these medicines if you haven't already. Please contact your care team if you have questions.     lisinopril 10 MG tablet   Commonly known as:  PRINIVIL/ZESTRIL   Stopped by:  Garret Main MD                Where to get your medicines      These medications were sent to [facility] - [address] [facility] [place] [place]  [address] [facility] [place] [address]     Phone:  [phone]     losartan 25 MG tablet    simvastatin 40 MG tablet                Primary Care Provider Office Phone # Fax #    [facility] [phone] [address] [facility]  [place] [address]        Equal Access to Services     [facility]: Hadii karthik naranjo hadasho Soomaali, waaxda luqadaha, qaybta kaalmada adeegyada, madi yanes . So [facility] [phone].    ATENCIÓN: Si habla español, tiene a mancia disposición servicios gratuitos de asistencia lingüística. [facility] [phone].    We comply with applicable federal civil rights laws and [place] laws. We do not discriminate on the basis of race, color, national origin, age, disability, sex, sexual orientation, or gender identity.            Thank you!     Thank you for choosing [facility]  for your care. Our goal is always to provide you with excellent care. Hearing back from our patients is one way we can continue to improve our services. Please take a few minutes to complete the written survey that you may receive in the mail after your visit with us. Thank you!             Your Updated Medication List - Protect others around you: Learn how to safely use, store and throw away your medicines at www.disposemymeds.org.          This list is accurate as of: 10/23/17  4:49 PM.  Always use your most recent med list.                   Brand Name Dispense  Instructions for use Diagnosis    losartan 25 MG tablet    COZAAR    90 tablet    Take 1 tablet (25 mg) by mouth daily    Benign essential hypertension       simvastatin 40 MG tablet    ZOCOR    90 tablet    Take 1 tablet (40 mg) by mouth At Bedtime    Benign essential hypertension, CKD (chronic kidney disease) stage 3, GFR 30-59 ml/min, Hyperlipidemia LDL goal <130

## 2017-10-23 NOTE — NURSING NOTE
"Ilia Arnold's goals for this visit include: CC  He requests these members of his care team be copied on today's visit information: No    PCP: Tim Hayden    Referring Provider:  No referring provider defined for this encounter.    Chief Complaint   Patient presents with     RECHECK       Initial There were no vitals taken for this visit. Estimated body mass index is 34.56 kg/(m^2) as calculated from the following:    Height as of 7/21/17: 1.753 m (5' 9\").    Weight as of 7/21/17: 106.1 kg (234 lb).  Medication Reconciliation: complete  "

## 2017-11-03 NOTE — PROGRESS NOTES
10/23/17    CC: proteinuria/FSGS    HPI: Ilia Arnold is a 89 year old male who presents for follow-up of FSGS. At the time of his first visit, his CKD risk factors included hypertension (few years), NSAID use, obesity. He was found to have an elevated creatinine, proteinuria and thus biopsy was pursued on 3/18/13 which showed findings most consistent with FSGS with some mild arteriolar thickening; <10% intersitial fibrosis. Unfortunately no EM was performed due to limited tissue in the setting of slightly difficult biopsy.    It has been years since his last follow-up but presents today for reevaluation. He moved to [place] for a period of time but is now back. His creatinine has been 1.48-1.73 since 2014 and stable at 1.73 now. Correlating GFR is 44. His last UPCR in 2013 was 0.58 g/g - urine studies pending at time of visit. He reports using ibuprofen prn. He has been away from lisinopril for the past 3-4 days. He admits to weight gain, increased stress. He noticed foam in the urine recently and around that time h ad flank pain but this has resolved.      No Known Allergies      No current outpatient prescriptions on file prior to visit.  No current facility-administered medications on file prior to visit.     Past Medical History:   Diagnosis Date     Gout      Hyperlipidemia LDL goal < 160      Hypertension        Past Surgical History:   Procedure Laterality Date     NO HISTORY OF SURGERY         Social History   Substance Use Topics     Smoking status: Never Smoker     Smokeless tobacco: Never Used     Alcohol use 2.0 oz/week     4 Standard drinks or equivalent per week      Comment: Periods of heavy etoh use but not routinely.        Family History   Problem Relation Age of Onset     Lipids Mother      C.A.D. Paternal Grandmother 65     cabg     Hypertension Paternal Grandmother      Lipids Brother      CEREBROVASCULAR DISEASE No family hx of      Cancer - colorectal No family hx of      Prostate Cancer No  family hx of        ROS: A 4 system review of systems was negative other than noted here or above.     Exam:  BP (!) 146/97 (BP Location: Left arm, Patient Position: Chair, Cuff Size: Adult Large)  Pulse 67  Wt 112.2 kg (247 lb 4.8 oz)  SpO2 98%  BMI 36.52 kg/m2    GENERAL APPEARANCE: alert and no distress  RESP: lungs clear to auscultation   CV: regular rhythm, normal rate, no rub  Extremities: no clubbing, cyanosis, or edema   MS: no evidence of inflammation in joints, no muscle tenderness  SKIN: no rash  NEURO: mentation intact and speech normal  PSYCH: affect normal/bright    Results  Office Visit on 10/23/2017   Component Date Value Ref Range Status     Color Urine 10/23/2017 Light Yellow   Final     Appearance Urine 10/23/2017 Clear   Final     Glucose Urine 10/23/2017 Negative  NEG^Negative mg/dL Final     Bilirubin Urine 10/23/2017 Negative  NEG^Negative Final     Ketones Urine 10/23/2017 Negative  NEG^Negative mg/dL Final     Specific Gravity Urine 10/23/2017 1.010  1.003 - 1.035 Final     Blood Urine 10/23/2017 Negative  NEG^Negative Final     pH Urine 10/23/2017 6.0  5.0 - 7.0 pH Final     Protein Albumin Urine 10/23/2017 100* NEG^Negative mg/dL Final     Urobilinogen mg/dL 10/23/2017 Normal  0.0 - 2.0 mg/dL Final     Nitrite Urine 10/23/2017 Negative  NEG^Negative Final     Leukocyte Esterase Urine 10/23/2017 Negative  NEG^Negative Final     Source 10/23/2017 Midstream Urine   Final     WBC Urine 10/23/2017 O - 2  OTO2^O - 2 /HPF Final     RBC Urine 10/23/2017 O - 2  OTO2^O - 2 /HPF Final   Orders Only on 10/23/2017   Component Date Value Ref Range Status     Hemoglobin 10/23/2017 15.5  13.3 - 17.7 g/dL Final     Parathyroid Hormone Intact 10/23/2017 101* 12 - 72 pg/mL Final     Protein Random Urine 10/23/2017 1.12  g/L Final     Protein Total Urine g/gr Creatinine 10/23/2017 1.32* 0 - 0.2 g/g Cr Final     Sodium 10/23/2017 139  133 - 144 mmol/L Final     Potassium 10/23/2017 Canceled, Test credited   3.4 - 5.3 mmol/L Final    Unsatisfactory specimen - hemolyzed     Chloride 10/23/2017 107  94 - 109 mmol/L Final     Carbon Dioxide 10/23/2017 29  20 - 32 mmol/L Final     Anion Gap 10/23/2017 3  3 - 14 mmol/L Final     Glucose 10/23/2017 90  70 - 99 mg/dL Final    Non Fasting     Urea Nitrogen 10/23/2017 18  7 - 30 mg/dL Final     Creatinine 10/23/2017 1.73* 0.66 - 1.25 mg/dL Final     GFR Estimate 10/23/2017 44* >60 mL/min/1.7m2 Final    Non  GFR Calc     GFR Estimate If Black 10/23/2017 53* >60 mL/min/1.7m2 Final    African American GFR Calc     Calcium 10/23/2017 9.0  8.5 - 10.1 mg/dL Final     Phosphorus 10/23/2017 2.8  2.5 - 4.5 mg/dL Final     Albumin 10/23/2017 3.7  3.4 - 5.0 g/dL Final     Creatinine Urine 10/23/2017 85  mg/dL Final          Assessment/Plan:  1. FSGS/CKD Stage 3: findings on biopsy are consistent with FSGS, however, unfortunately EM was not available due to limited sample. That biopsy was years ago. I would like to get another pathology view of his biopsy - will discuss with pathology as to whether that is a possibility. Will get him restarted on lisinopril and follow-up labs in the near future to assure stability. Will plan to follow him routinely to assure we have his urine protein optimized. Educated on benefit of avoiding NSAIDs as well as weight loss.     2. Hypertension: has been away from lisinopril - given his gout hx, I am going to start him on losartan instead of the lisinoipril - will optimize as he can tolerate.     3. Obesity: educated on benefits of weight loss.         Garret Main, DO

## 2018-01-18 DIAGNOSIS — N18.30 CKD (CHRONIC KIDNEY DISEASE) STAGE 3, GFR 30-59 ML/MIN (H): Primary | ICD-10-CM

## 2018-02-20 ENCOUNTER — OFFICE VISIT (OUTPATIENT)
Dept: NEPHROLOGY | Facility: CLINIC | Age: 43
End: 2018-02-20
Payer: COMMERCIAL

## 2018-02-20 VITALS
WEIGHT: 253.4 LBS | SYSTOLIC BLOOD PRESSURE: 129 MMHG | BODY MASS INDEX: 37.42 KG/M2 | OXYGEN SATURATION: 96 % | DIASTOLIC BLOOD PRESSURE: 72 MMHG | HEART RATE: 67 BPM

## 2018-02-20 DIAGNOSIS — N18.30 CKD (CHRONIC KIDNEY DISEASE) STAGE 3, GFR 30-59 ML/MIN (H): ICD-10-CM

## 2018-02-20 DIAGNOSIS — N18.30 CKD (CHRONIC KIDNEY DISEASE) STAGE 3, GFR 30-59 ML/MIN (H): Primary | ICD-10-CM

## 2018-02-20 DIAGNOSIS — N25.81 SECONDARY RENAL HYPERPARATHYROIDISM (H): ICD-10-CM

## 2018-02-20 DIAGNOSIS — M10.9 ACUTE GOUTY ARTHRITIS: ICD-10-CM

## 2018-02-20 LAB
ALBUMIN UR-MCNC: 100 MG/DL
ANION GAP SERPL CALCULATED.3IONS-SCNC: 4 MMOL/L (ref 3–14)
APPEARANCE UR: CLEAR
BILIRUB UR QL STRIP: NEGATIVE
BUN SERPL-MCNC: 30 MG/DL (ref 7–30)
CALCIUM SERPL-MCNC: 8.7 MG/DL (ref 8.5–10.1)
CHLORIDE SERPL-SCNC: 104 MMOL/L (ref 94–109)
CO2 SERPL-SCNC: 29 MMOL/L (ref 20–32)
COLOR UR AUTO: ABNORMAL
CREAT SERPL-MCNC: 2 MG/DL (ref 0.66–1.25)
CREAT UR-MCNC: 121 MG/DL
GFR SERPL CREATININE-BSD FRML MDRD: 37 ML/MIN/1.7M2
GLUCOSE SERPL-MCNC: 95 MG/DL (ref 70–99)
GLUCOSE UR STRIP-MCNC: NEGATIVE MG/DL
HGB BLD-MCNC: 15.5 G/DL (ref 13.3–17.7)
HGB UR QL STRIP: NEGATIVE
KETONES UR STRIP-MCNC: NEGATIVE MG/DL
LEUKOCYTE ESTERASE UR QL STRIP: NEGATIVE
NITRATE UR QL: NEGATIVE
PH UR STRIP: 6 PH (ref 5–7)
POTASSIUM SERPL-SCNC: 4.8 MMOL/L (ref 3.4–5.3)
PROT UR-MCNC: 0.92 G/L
PROT/CREAT 24H UR: 0.76 G/G CR (ref 0–0.2)
PTH-INTACT SERPL-MCNC: 69 PG/ML (ref 12–72)
RBC #/AREA URNS AUTO: ABNORMAL /HPF
SODIUM SERPL-SCNC: 137 MMOL/L (ref 133–144)
SOURCE: ABNORMAL
SP GR UR STRIP: 1.01 (ref 1–1.03)
URATE SERPL-MCNC: 8.9 MG/DL (ref 3.5–7.2)
UROBILINOGEN UR STRIP-MCNC: NORMAL MG/DL (ref 0–2)
WBC #/AREA URNS AUTO: ABNORMAL /HPF

## 2018-02-20 PROCEDURE — 99214 OFFICE O/P EST MOD 30 MIN: CPT | Performed by: INTERNAL MEDICINE

## 2018-02-20 PROCEDURE — 36415 COLL VENOUS BLD VENIPUNCTURE: CPT | Performed by: INTERNAL MEDICINE

## 2018-02-20 PROCEDURE — 84550 ASSAY OF BLOOD/URIC ACID: CPT | Performed by: INTERNAL MEDICINE

## 2018-02-20 PROCEDURE — 80048 BASIC METABOLIC PNL TOTAL CA: CPT | Performed by: INTERNAL MEDICINE

## 2018-02-20 PROCEDURE — 82306 VITAMIN D 25 HYDROXY: CPT | Performed by: INTERNAL MEDICINE

## 2018-02-20 PROCEDURE — 81001 URINALYSIS AUTO W/SCOPE: CPT | Performed by: INTERNAL MEDICINE

## 2018-02-20 PROCEDURE — 85018 HEMOGLOBIN: CPT | Performed by: INTERNAL MEDICINE

## 2018-02-20 PROCEDURE — 83970 ASSAY OF PARATHORMONE: CPT | Performed by: INTERNAL MEDICINE

## 2018-02-20 PROCEDURE — 84156 ASSAY OF PROTEIN URINE: CPT | Performed by: INTERNAL MEDICINE

## 2018-02-20 RX ORDER — PREDNISONE 10 MG/1
TABLET ORAL
Qty: 13 TABLET | Refills: 1 | Status: SHIPPED | OUTPATIENT
Start: 2018-02-20 | End: 2018-02-20

## 2018-02-20 RX ORDER — PREDNISONE 10 MG/1
TABLET ORAL
Qty: 13 TABLET | Refills: 1 | Status: SHIPPED | OUTPATIENT
Start: 2018-02-20 | End: 2018-04-05

## 2018-02-20 ASSESSMENT — PAIN SCALES - GENERAL: PAINLEVEL: MODERATE PAIN (5)

## 2018-02-20 NOTE — PATIENT INSTRUCTIONS
1. Good hydration next week and repeat lab in [place]  2. Prednisone:  - 40 mg day 1  - Days 2-4: 20 mg daily  - Days 5-7: 10 mg daily  3. Follow-up in 4 months

## 2018-02-20 NOTE — NURSING NOTE
"Ilia Arnold's goals for this visit include:   Chief Complaint   Patient presents with     RECHECK     Three month follow up       He requests these members of his care team be copied on today's visit information: PCP    PCP: Tim Hayden    Referring Provider:  No referring provider defined for this encounter.    Chief Complaint   Patient presents with     RECHECK     Three month follow up       Initial /72 (BP Location: Left arm, Patient Position: Chair, Cuff Size: Adult Large)  Pulse 67  Wt 114.9 kg (253 lb 6.4 oz)  SpO2 96%  BMI 37.42 kg/m2 Estimated body mass index is 37.42 kg/(m^2) as calculated from the following:    Height as of 7/21/17: 1.753 m (5' 9\").    Weight as of this encounter: 114.9 kg (253 lb 6.4 oz).  Medication Reconciliation: complete         Medication Refills: none        Carlita Gould CMA        "

## 2018-02-23 LAB
DEPRECATED CALCIDIOL+CALCIFEROL SERPL-MC: <19 UG/L (ref 20–75)
VITAMIN D2 SERPL-MCNC: <5 UG/L
VITAMIN D3 SERPL-MCNC: 14 UG/L

## 2018-03-01 ENCOUNTER — TELEPHONE (OUTPATIENT)
Dept: NEPHROLOGY | Facility: CLINIC | Age: 43
End: 2018-03-01

## 2018-03-01 DIAGNOSIS — E55.9 VITAMIN D DEFICIENCY: Primary | ICD-10-CM

## 2018-03-01 RX ORDER — ERGOCALCIFEROL 1.25 MG/1
50000 CAPSULE, LIQUID FILLED ORAL
Qty: 4 CAPSULE | Refills: 0 | Status: SHIPPED | OUTPATIENT
Start: 2018-03-01 | End: 2018-03-23

## 2018-03-01 NOTE — TELEPHONE ENCOUNTER
Left message for patient with lab results and recommendations per Dr. Main.    Your vitamin D level is low at this time. I recommend replacement with the following:.     - ergocalciferol 50,000 units to be taken weekly for 4 weeks   - in 4 weeks, then take cholecalciferol 1000 units per day       Please let us know if you are ok with this regimen and we will send it to your pharmacy. Please assure you get the repeat lab this week as well to assure your kidney function is improved from our visit.     Manuela Tamez, RN, BSN  Nephrology Care Coordinator  [facility]

## 2018-03-07 NOTE — PROGRESS NOTES
2/20/18    CC: proteinuria/FSGS    HPI: Ilia Arnold is a 42 year old male who presents for follow-up of FSGS. At the time of his first visit, his CKD risk factors included hypertension (few years), NSAID use, obesity. He was found to have an elevated creatinine, proteinuria and thus biopsy was pursued on 3/18/13 which showed findings most consistent with FSGS with some mild arteriolar thickening; <10% intersitial fibrosis. Unfortunately no EM was performed due to limited tissue in the setting of slightly difficult biopsy.    Today he presents for follow-up. His creatinine has been 1.65-1.73 in the past 6 months but increased to 2 today. On discussion with his today, he admits to increased etoh use as well as unhealthy eating in the past week - he feels this triggered his gout which he has had much difficulty with over the weekend. He reports that his family member had indomethacin available that they use for their gout and offered it to him - he reports taking indomethacin (3 of them for 2 days, 2 of them for 2 days, 1 for 2 days and the last day was Saturday or Sunday. He also used ibuprofen yesterday. He denies any fever. We spent time at today's visit educating on the risk of NSAID use with his underlying kidney disease. He did not realize that indomethacin was an NSAID.      No Known Allergies      Current Outpatient Prescriptions on File Prior to Visit:  simvastatin (ZOCOR) 40 MG tablet Take 1 tablet (40 mg) by mouth At Bedtime   losartan (COZAAR) 25 MG tablet Take 1 tablet (25 mg) by mouth daily     No current facility-administered medications on file prior to visit.     Past Medical History:   Diagnosis Date     Gout      Hyperlipidemia LDL goal < 160      Hypertension        Past Surgical History:   Procedure Laterality Date     NO HISTORY OF SURGERY         Social History   Substance Use Topics     Smoking status: Never Smoker     Smokeless tobacco: Never Used     Alcohol use 2.0 oz/week     4 Standard  drinks or equivalent per week      Comment: Periods of heavy etoh use but not routinely.        Family History   Problem Relation Age of Onset     Lipids Mother      C.A.D. Paternal Grandmother 65     cabg     Hypertension Paternal Grandmother      Lipids Brother      CEREBROVASCULAR DISEASE No family hx of      Cancer - colorectal No family hx of      Prostate Cancer No family hx of        ROS: A 4 system review of systems was negative other than noted here or above.     Exam:  /72 (BP Location: Left arm, Patient Position: Chair, Cuff Size: Adult Large)  Pulse 67  Wt 114.9 kg (253 lb 6.4 oz)  SpO2 96%  BMI 37.42 kg/m2    GENERAL APPEARANCE: alert and no distress  RESP: lungs clear to auscultation   CV: regular rhythm, normal rate, no rub  Extremities: no clubbing, cyanosis, or edema   MS: no evidence of inflammation in joints, no muscle tenderness  SKIN: no rash  NEURO: mentation intact and speech normal  PSYCH: affect normal/bright    Results  Office Visit on 02/20/2018   Component Date Value Ref Range Status     Color Urine 02/20/2018 Light Yellow   Final     Appearance Urine 02/20/2018 Clear   Final     Glucose Urine 02/20/2018 Negative  NEG^Negative mg/dL Final     Bilirubin Urine 02/20/2018 Negative  NEG^Negative Final     Ketones Urine 02/20/2018 Negative  NEG^Negative mg/dL Final     Specific Gravity Urine 02/20/2018 1.011  1.003 - 1.035 Final     Blood Urine 02/20/2018 Negative  NEG^Negative Final     pH Urine 02/20/2018 6.0  5.0 - 7.0 pH Final     Protein Albumin Urine 02/20/2018 100* NEG^Negative mg/dL Final     Urobilinogen mg/dL 02/20/2018 Normal  0.0 - 2.0 mg/dL Final     Nitrite Urine 02/20/2018 Negative  NEG^Negative Final     Leukocyte Esterase Urine 02/20/2018 Negative  NEG^Negative Final     Source 02/20/2018 Unspecified Urine   Final     WBC Urine 02/20/2018 O - 2  OTO2^O - 2 /HPF Final     RBC Urine 02/20/2018 O - 2  OTO2^O - 2 /HPF Final     Uric Acid 02/20/2018 8.9* 3.5 - 7.2 mg/dL  Final     25 OH Vit D2 02/20/2018 <5  ug/L Final     25 OH Vit D3 02/20/2018 14  ug/L Final     25 OH Vit D total 02/20/2018 <19* 20 - 75 ug/L Final    Comment: Season, race, dietary intake, and treatment affect the concentration of   25-hydroxy-Vitamin D. Values may decrease during winter months and increase   during summer months. Values 20-29 ug/L may indicate Vitamin D insufficiency   and values <20 ug/L may indicate Vitamin D deficiency.  This test was developed and its performance characteristics determined by the   [facility],  Special Chemistry Laboratory. It has   not been cleared or approved by the FDA. The laboratory is regulated under   CLIA as qualified to perform high-complexity testing. This test is used for   clinical purposes. It should not be regarded as investigational or for   research.     Orders Only on 02/20/2018   Component Date Value Ref Range Status     Sodium 02/20/2018 137  133 - 144 mmol/L Final     Potassium 02/20/2018 4.8  3.4 - 5.3 mmol/L Final     Chloride 02/20/2018 104  94 - 109 mmol/L Final     Carbon Dioxide 02/20/2018 29  20 - 32 mmol/L Final     Anion Gap 02/20/2018 4  3 - 14 mmol/L Final     Glucose 02/20/2018 95  70 - 99 mg/dL Final    Non Fasting     Urea Nitrogen 02/20/2018 30  7 - 30 mg/dL Final     Creatinine 02/20/2018 2.00* 0.66 - 1.25 mg/dL Final     GFR Estimate 02/20/2018 37* >60 mL/min/1.7m2 Final    Non  GFR Calc     GFR Estimate If Black 02/20/2018 44* >60 mL/min/1.7m2 Final    African American GFR Calc     Calcium 02/20/2018 8.7  8.5 - 10.1 mg/dL Final     Protein Random Urine 02/20/2018 0.92  g/L Final     Protein Total Urine g/gr Creatinine 02/20/2018 0.76* 0 - 0.2 g/g Cr Final     Parathyroid Hormone Intact 02/20/2018 69  12 - 72 pg/mL Final     Hemoglobin 02/20/2018 15.5  13.3 - 17.7 g/dL Final     Creatinine Urine 02/20/2018 121  mg/dL Final        Assessment/Plan:  1. FSGS/CKD Stage 3: findings on biopsy are  consistent with FSGS, however, unfortunately EM was not available due to limited sample. I have reviewed his biopsy with our current renal pathologist and findings suggest a secondary FSGS.  He is currently on losartan 25 mg daily - ideally would increase this further but hesitant to do so ini the setting of an VIVIAN.   His VIVIAN is likely related to NSAID intake he has taken most recently. Educated to avoid all NSAIDs. Encrouaged good hydration and recommeded repeating kidney function testing next week.     2. Hypertension: at goal currently - ideally will increase losartan further in the near future but holding off for now given rise in creatinine today.      3. Obesity: educated on benefits of weight loss.     4. Gout: will give short prednisone course - educated that his sxs will need addt evaluation if not resolved with this regimen.     Patient Instructions   1. Good hydration next week and repeat lab in [place]  2. Prednisone:  - 40 mg day 1  - Days 2-4: 20 mg daily  - Days 5-7: 10 mg daily  3. Follow-up in 4 months       Garret Main,

## 2018-03-13 ENCOUNTER — TELEPHONE (OUTPATIENT)
Dept: NEPHROLOGY | Facility: CLINIC | Age: 43
End: 2018-03-13

## 2018-03-13 NOTE — TELEPHONE ENCOUNTER
----- Message from Garret Main MD sent at 3/7/2018  2:28 PM CST -----  Please call Ilia to remind him for the need to repeat labs......    We need renal panel. Thanks, KW

## 2018-03-13 NOTE — TELEPHONE ENCOUNTER
1st attempt to reach patient to schedule a lab appointment(renal labs) per Dr. Main; cholom and sent [employer] Message.    [facility]~Specialty/Med Surg   [phone]

## 2018-04-03 DIAGNOSIS — M10.9 ACUTE GOUTY ARTHRITIS: ICD-10-CM

## 2018-04-03 RX ORDER — PREDNISONE 10 MG/1
TABLET ORAL
Qty: 13 TABLET | Refills: 1 | Status: CANCELLED | OUTPATIENT
Start: 2018-04-03

## 2018-04-04 NOTE — TELEPHONE ENCOUNTER
"Contacted Ilia. He reports having a \"huge flare.\" His big toe and first 3 toes on his left foot are affected. The joints are swollen and red. This flare has been going on for one week and he thought that he could manage on own, though is asking for a prescription for prednisone from Dr. Rader to help. He plans to go to [place] on a hiking trip leaving this Sunday and is worried how this could affect his trip. When he gets back from [place], he plans to start an anti-inflammatory diet with another friend. He feels like his diet has been good recently therefore he is confused by the increase in flares. He is hoping to avoid the Allopurinol again if possible.   He reports having had a lot of gout flares as of recently and Dr. lemus had previously given him back to back prescriptions about 2 months ago for the same problem.     Routing to Dr. Lemus to advise treatment of active gout flare.    Manuela Tamez RN, BSN  Nephrology Care Coordinator  [facility]    "

## 2018-04-04 NOTE — TELEPHONE ENCOUNTER
Ilia is calling this morning to check the status of his refill request.  Please advise.  Thank you.

## 2018-04-05 ENCOUNTER — OFFICE VISIT (OUTPATIENT)
Dept: FAMILY MEDICINE | Facility: CLINIC | Age: 43
End: 2018-04-05
Payer: COMMERCIAL

## 2018-04-05 VITALS
WEIGHT: 256 LBS | RESPIRATION RATE: 18 BRPM | SYSTOLIC BLOOD PRESSURE: 123 MMHG | DIASTOLIC BLOOD PRESSURE: 80 MMHG | HEART RATE: 94 BPM | OXYGEN SATURATION: 98 % | BODY MASS INDEX: 37.92 KG/M2 | TEMPERATURE: 98.1 F | HEIGHT: 69 IN

## 2018-04-05 DIAGNOSIS — E66.01 MORBID OBESITY (H): ICD-10-CM

## 2018-04-05 DIAGNOSIS — M10.9 ACUTE GOUTY ARTHRITIS: ICD-10-CM

## 2018-04-05 DIAGNOSIS — N18.30 CKD (CHRONIC KIDNEY DISEASE) STAGE 3, GFR 30-59 ML/MIN (H): Primary | ICD-10-CM

## 2018-04-05 PROCEDURE — 99213 OFFICE O/P EST LOW 20 MIN: CPT | Performed by: PHYSICIAN ASSISTANT

## 2018-04-05 RX ORDER — ALLOPURINOL 100 MG/1
100 TABLET ORAL DAILY
Qty: 90 TABLET | Refills: 3 | Status: SHIPPED | OUTPATIENT
Start: 2018-04-05 | End: 2019-06-26

## 2018-04-05 RX ORDER — PREDNISONE 10 MG/1
TABLET ORAL
Qty: 13 TABLET | Refills: 1 | Status: SHIPPED | OUTPATIENT
Start: 2018-04-05 | End: 2018-04-05

## 2018-04-05 RX ORDER — PREDNISONE 10 MG/1
TABLET ORAL
Qty: 42 TABLET | Refills: 0 | Status: SHIPPED | OUTPATIENT
Start: 2018-04-05 | End: 2018-05-18

## 2018-04-05 NOTE — TELEPHONE ENCOUNTER
Contacted patient back. He had gone in to see Lazaro Ty today and has a set treatment plan. He had been meaning to establish with a PCP and he was happy to today. Informed him that Dr. Main did have a plan and had advised that he restart Allopurinol. Dr. Ty had the same recommendation. Also informed him of overdue lab result which he is aware of. He has been meaning to have the lab rechecked.    No further action to be taken at this time.     Manuela Tamez RN,BSN  Nephrology Care Coordinator  [facility]

## 2018-04-05 NOTE — MR AVS SNAPSHOT
After Visit Summary   4/5/2018    Ilia Arnold    MRN: [id number]           Patient Information     Date Of Birth          1975        Visit Information        Provider Department      4/5/2018 3:00 PM Lazaro Ty PA-C [facility] [place]        Today's Diagnoses     CKD (chronic kidney disease) stage 3, GFR 30-59 ml/min    -  1    Acute gouty arthritis           Follow-ups after your visit        Your next 10 appointments already scheduled     Jun 18, 2018  4:00 PM CDT   LAB with LAB FIRST FLOOR [facility] ([facility])    [address] [place] [id number]   [phone]           Please do not eat 10-12 hours before your appointment if you are coming in fasting for labs on lipids, cholesterol, or glucose (sugar). This does not apply to pregnant women. Water, hot tea and black coffee (with nothing added) are okay. Do not drink other fluids, diet soda or chew gum.            Jun 18, 2018  4:30 PM CDT   Return Visit with Garret Main MD   [facility]    [address] [place] [id number] [phone]              Who to contact     Normal or non-critical lab and imaging results will be communicated to you by MyChart, letter or phone within 4 business days after the clinic has received the results. If you do not hear from us within 7 days, please contact the clinic through MyChart or phone. If you have a critical or abnormal lab result, we will notify you by phone as soon as possible.  Submit refill requests through [email or web address] or call your pharmacy and they will forward the refill request to us. Please allow 3 business days for your refill to be completed.          If you need to speak with a  for additional information , please call: [phone]             Additional Information About Your Visit        [email or web address] Information     [email or web address] gives you  "secure access to your electronic health record. If you see a primary care provider, you can also send messages to your care team and make appointments. If you have questions, please call your primary care clinic.  If you do not have a primary care provider, please call [phone] and they will assist you.        Care EveryWhere ID     This is your Care EveryWhere ID. This could be used by other organizations to access your [facility] medical records  [id number]        Your Vitals Were     Pulse Temperature Respirations Height Pulse Oximetry BMI (Body Mass Index)    94 98.1  F (36.7  C) (Tympanic) 18 5' 9\" (1.753 m) 98% 37.8 kg/m2       Blood Pressure from Last 3 Encounters:   04/05/18 123/80   02/20/18 129/72   10/23/17 (!) 146/97    Weight from Last 3 Encounters:   04/05/18 256 lb (116.1 kg)   02/20/18 253 lb 6.4 oz (114.9 kg)   10/23/17 247 lb 4.8 oz (112.2 kg)              Today, you had the following     No orders found for display         Today's Medication Changes          These changes are accurate as of 4/5/18  3:45 PM.  If you have any questions, ask your nurse or doctor.               Start taking these medicines.        Dose/Directions    allopurinol 100 MG tablet   Commonly known as:  ZYLOPRIM   Used for:  Acute gouty arthritis   Started by:  Lazaro Ty PA-C        Dose:  100 mg   Take 1 tablet (100 mg) by mouth daily   Quantity:  90 tablet   Refills:  3         These medicines have changed or have updated prescriptions.        Dose/Directions    predniSONE 10 MG tablet   Commonly known as:  DELTASONE   This may have changed:  additional instructions   Used for:  Acute gouty arthritis   Changed by:  Lazaro Ty PA-C        Take 60 mg days 1 and 2, 50 mg days 3 and 4, 40 mg days 5 and 6, 30 mg days 7 and 8, 20 mg days 9 and 10, 10 mg days 11 and 12   Quantity:  42 tablet   Refills:  0            Where to get your medicines      These medications were sent to [facility] [place] - [place] - [address] [facility] [place]  [address] [facility] [place] [address]     Phone:  [phone]     allopurinol 100 MG tablet    predniSONE 10 MG tablet                Primary Care Provider Office Phone # Fax #    [place] [facility] [phone] [address] [facility]  [place] [address]        Equal Access to Services     [facility] : Hadii aad ku hadasho Soomaali, waaxda luqadaha, qaybta kaalmada adeegyada, waxay idiin hayaan adeeg kharash la'aan ah. So [facility] [phone].    ATENCIÓN: Si habla español, tiene a mancia disposición servicios gratuitos de asistencia lingüística. Llame al [phone].    We comply with applicable federal civil rights laws and [place] laws. We do not discriminate on the basis of race, color, national origin, age, disability, sex, sexual orientation, or gender identity.            Thank you!     Thank you for choosing [facility]  for your care. Our goal is always to provide you with excellent care. Hearing back from our patients is one way we can continue to improve our services. Please take a few minutes to complete the written survey that you may receive in the mail after your visit with us. Thank you!             Your Updated Medication List - Protect others around you: Learn how to safely use, store and throw away your medicines at www.disposemymeds.org.          This list is accurate as of 4/5/18  3:45 PM.  Always use your most recent med list.                   Brand Name Dispense Instructions for use Diagnosis    allopurinol 100 MG tablet    ZYLOPRIM    90 tablet    Take 1 tablet (100 mg) by mouth daily    Acute gouty arthritis       cholecalciferol 1000 UNIT tablet    vitamin D3    100 tablet    Take 1 tablet (1,000 Units) by mouth daily Start after ergocalciferol therapy complete    Vitamin D deficiency       losartan 25 MG tablet    COZAAR    90 tablet    Take 1 tablet (25 mg) by mouth daily    Benign essential hypertension        predniSONE 10 MG tablet    DELTASONE    42 tablet    Take 60 mg days 1 and 2, 50 mg days 3 and 4, 40 mg days 5 and 6, 30 mg days 7 and 8, 20 mg days 9 and 10, 10 mg days 11 and 12    Acute gouty arthritis       simvastatin 40 MG tablet    ZOCOR    90 tablet    Take 1 tablet (40 mg) by mouth At Bedtime    Benign essential hypertension, CKD (chronic kidney disease) stage 3, GFR 30-59 ml/min, Hyperlipidemia LDL goal <130

## 2018-04-05 NOTE — TELEPHONE ENCOUNTER
[facility] Call Center    Phone Message    May a detailed message be left on voicemail: yes    Reason for Call: Other: Patient is following up with clinic to see what is advised for him from his request yesterday. Please advise.     Action Taken: Message routed to:  Adult Clinics: Nephrology p [id number]

## 2018-04-05 NOTE — PROGRESS NOTES
SUBJECTIVE:   Ilia Arnold is a 42 year old male who presents to clinic today for the following health issues:      Gout/ single inflamed joint   Onset: chronic    Description:   Location: right knee and left toes -   Joint Swelling: YES  Redness: YES  Pain: YES    Intensity: moderate    Progression of Symptoms:  worsening    Accompanying Signs & Symptoms:  Fevers: no     History:   Trauma to the area: no   Previous history of gout: YES   Recent illness:  no     Precipitating factors:   Diet-rich in purine: no  Alcohol use: YES- rare  Diuretic use: no     Alleviating factors:      Therapies Tried and outcome: prednisone            Problem list and histories reviewed & adjusted, as indicated.  Additional history: as documented    BP Readings from Last 3 Encounters:   04/05/18 123/80   02/20/18 129/72   10/23/17 (!) 146/97    Wt Readings from Last 3 Encounters:   04/05/18 256 lb (116.1 kg)   02/20/18 253 lb 6.4 oz (114.9 kg)   10/23/17 247 lb 4.8 oz (112.2 kg)                    Reviewed and updated as needed this visit by clinical staff  Tobacco  Allergies  Meds       Reviewed and updated as needed this visit by Provider         All other systems negative except as outline above  OBJECTIVE:  Left fore foot : dusky red and swollen.     Right knee : swelling suprapatellar pouch.     Ilia was seen today for arthritis.    Diagnoses and all orders for this visit:    Acute gouty arthritis    -     predniSONE (DELTASONE) 10 MG tablet; Take 60 mg days 1 and 2, 50 mg days 3 and 4, 40 mg days 5 and 6, 30 mg days 7 and 8, 20 mg days 9 and 10, 10 mg days 11 and 12  -     allopurinol (ZYLOPRIM) 100 MG tablet; Take 1 tablet (100 mg) by mouth daily    Recheck of obesity. Patient to   work on lifestyle modification    Recheck prn

## 2018-04-20 ENCOUNTER — TELEPHONE (OUTPATIENT)
Dept: FAMILY MEDICINE | Facility: CLINIC | Age: 43
End: 2018-04-20

## 2018-04-20 DIAGNOSIS — M10.9 ACUTE GOUTY ARTHRITIS: ICD-10-CM

## 2018-04-20 RX ORDER — COLCHICINE 0.6 MG/1
TABLET ORAL
Qty: 30 TABLET | Refills: 0 | Status: SHIPPED | OUTPATIENT
Start: 2018-04-20 | End: 2018-09-18

## 2018-04-20 RX ORDER — PREDNISONE 10 MG/1
TABLET ORAL
Qty: 42 TABLET | Refills: 0 | Status: CANCELLED | OUTPATIENT
Start: 2018-04-20

## 2018-04-20 NOTE — TELEPHONE ENCOUNTER
Patient states his gout in left foot has flared up again. Can he get more Prednisone?  Ok to leave a message.

## 2018-04-20 NOTE — TELEPHONE ENCOUNTER
Spoke with patient, he completed the prednisone, was off for about a week, he was just about to start taking the allopurinol but woke today with a gout flare to left foot. Red, swelling pain, same as appt. 4/5/18.  Patient has to be on his feet for work and asking if can have a refill on the prednisone script?(pended for approval).  And asking when to start taking the Allopurinol? Can he take while on Pred? Or has to wait until completed?  Please advise in provider's absence.  Flakita Cao RN

## 2018-04-20 NOTE — TELEPHONE ENCOUNTER
Given significant prednisone worth trying colchicine -- don't start allopurinol until flare is fully resolved.

## 2018-05-18 ENCOUNTER — OFFICE VISIT (OUTPATIENT)
Dept: FAMILY MEDICINE | Facility: CLINIC | Age: 43
End: 2018-05-18
Payer: COMMERCIAL

## 2018-05-18 VITALS
BODY MASS INDEX: 37.92 KG/M2 | TEMPERATURE: 98.3 F | RESPIRATION RATE: 18 BRPM | DIASTOLIC BLOOD PRESSURE: 89 MMHG | SYSTOLIC BLOOD PRESSURE: 136 MMHG | HEART RATE: 90 BPM | HEIGHT: 69 IN | WEIGHT: 256 LBS | OXYGEN SATURATION: 98 %

## 2018-05-18 DIAGNOSIS — M10.9 ACUTE GOUTY ARTHRITIS: ICD-10-CM

## 2018-05-18 PROCEDURE — 99213 OFFICE O/P EST LOW 20 MIN: CPT | Performed by: PHYSICIAN ASSISTANT

## 2018-05-18 RX ORDER — PREDNISONE 10 MG/1
TABLET ORAL
Qty: 42 TABLET | Refills: 0 | Status: SHIPPED | OUTPATIENT
Start: 2018-05-18 | End: 2018-07-02

## 2018-05-18 NOTE — MR AVS SNAPSHOT
After Visit Summary   5/18/2018    Ilia Arnold    MRN: [id number]           Patient Information     Date Of Birth          1975        Visit Information        Provider Department      5/18/2018 9:00 AM Lazaro Ty PA-C [facility] [place]        Today's Diagnoses     Acute gouty arthritis           Follow-ups after your visit        Your next 10 appointments already scheduled     Jun 18, 2018  4:00 PM CDT   LAB with LAB FIRST FLOOR [facility] ([facility])    [address] [place] [id number]   [phone]           Please do not eat 10-12 hours before your appointment if you are coming in fasting for labs on lipids, cholesterol, or glucose (sugar). This does not apply to pregnant women. Water, hot tea and black coffee (with nothing added) are okay. Do not drink other fluids, diet soda or chew gum.            Jun 18, 2018  4:30 PM CDT   Return Visit with Garret Main MD   [facility])    [address] [place] [address]   [phone]              Future tests that were ordered for you today     Open Future Orders        Priority Expected Expires Ordered    Uric acid Routine  5/18/2019 5/18/2018            Who to contact     Normal or non-critical lab and imaging results will be communicated to you by MyChart, letter or phone within 4 business days after the clinic has received the results. If you do not hear from us within 7 days, please contact the clinic through MyChart or phone. If you have a critical or abnormal lab result, we will notify you by phone as soon as possible.  Submit refill requests through [employer] or call your pharmacy and they will forward the refill request to us. Please allow 3 business days for your refill to be completed.          If you need to speak with a  for additional information , please call: [phone]        "      Additional Information About Your Visit        MyChart Information     "[employer] gives you secure access to your electronic health record. If you see a primary care provider, you can also send messages to your care team and make appointments. If you have questions, please call your primary care clinic.  If you do not have a primary care provider, please call [phone] and they will assist you.        Care EveryWhere ID     This is your Care EveryWhere ID. This could be used by other organizations to access your [facility] medical records  [id number]        Your Vitals Were     Pulse Temperature Respirations Height Pulse Oximetry BMI (Body Mass Index)    90 98.3  F (36.8  C) (Tympanic) 18 5' 9\" (1.753 m) 98% 37.8 kg/m2       Blood Pressure from Last 3 Encounters:   05/18/18 136/89   04/05/18 123/80   02/20/18 129/72    Weight from Last 3 Encounters:   05/18/18 256 lb (116.1 kg)   04/05/18 256 lb (116.1 kg)   02/20/18 253 lb 6.4 oz (114.9 kg)                 Where to get your medicines      These medications were sent to [facility] [address] [facility]  [address] [facility] [address]     Phone:  [phone]     predniSONE 10 MG tablet          Primary Care Provider Office Phone # Fax #    Lazaroharmeet Ty PA-C [phone] [address]  [place] [address]        Equal Access to Services     [facility] AH: Hadii aad ku hadasho Soomaali, waaxda luqadaha, qaybta kaalmada adeegyada, waxsylvia yanes . So [facility] [phone].    ATENCIÓN: Si habla español, tiene a mancia disposición servicios gratuitos de asistencia lingüística. Llame al [phone].    We comply with applicable federal civil rights laws and [place] laws. We do not discriminate on the basis of race, color, national origin, age, disability, sex, sexual orientation, or gender identity.            Thank you!     Thank you for choosing [facility] [place]  for " your care. Our goal is always to provide you with excellent care. Hearing back from our patients is one way we can continue to improve our services. Please take a few minutes to complete the written survey that you may receive in the mail after your visit with us. Thank you!             Your Updated Medication List - Protect others around you: Learn how to safely use, store and throw away your medicines at www.disposemymeds.org.          This list is accurate as of 5/18/18  9:33 AM.  Always use your most recent med list.                   Brand Name Dispense Instructions for use Diagnosis    allopurinol 100 MG tablet    ZYLOPRIM    90 tablet    Take 1 tablet (100 mg) by mouth daily    Acute gouty arthritis       cholecalciferol 1000 UNIT tablet    vitamin D3    100 tablet    Take 1 tablet (1,000 Units) by mouth daily Start after ergocalciferol therapy complete    Vitamin D deficiency       colchicine 0.6 MG tablet    COLCRYS    30 tablet    Take 2 tabs (1.2mg) then 1 tab (0.6mg) 1 hour later.  Wait 12 hours then take daily until flare resolves.    Acute gouty arthritis       losartan 25 MG tablet    COZAAR    90 tablet    Take 1 tablet (25 mg) by mouth daily    Benign essential hypertension       predniSONE 10 MG tablet    DELTASONE    42 tablet    Take 60 mg days 1 and 2, 50 mg days 3 and 4, 40 mg days 5 and 6, 30 mg days 7 and 8, 20 mg days 9 and 10, 10 mg days 11 and 12    Acute gouty arthritis       simvastatin 40 MG tablet    ZOCOR    90 tablet    Take 1 tablet (40 mg) by mouth At Bedtime    Benign essential hypertension, CKD (chronic kidney disease) stage 3, GFR 30-59 ml/min, Hyperlipidemia LDL goal <130

## 2018-05-18 NOTE — PROGRESS NOTES
SUBJECTIVE:   Ilia Arnold is a 42 year old male who presents to clinic today for the following health issues:      Gout/ single inflamed joint   Onset: chronic    Description:   Location: foot - right  Joint Swelling: YES  Redness: YES  Pain: YES    Intensity: moderate, severe    Progression of Symptoms:  worsening    Accompanying Signs & Symptoms:  Fevers: no     History:   Trauma to the area: no   Previous history of gout: YES   Recent illness:  no     Precipitating factors:   Diet-rich in purine: no  Alcohol use: YES  Diuretic use: no     Alleviating factors:      Therapies Tried and outcome: none, allopurinol and colchicine            Problem list and histories reviewed & adjusted, as indicated.  Additional history: as documented    BP Readings from Last 3 Encounters:   05/18/18 136/89   04/05/18 123/80   02/20/18 129/72    Wt Readings from Last 3 Encounters:   05/18/18 256 lb (116.1 kg)   04/05/18 256 lb (116.1 kg)   02/20/18 253 lb 6.4 oz (114.9 kg)                    Reviewed and updated as needed this visit by clinical staff  Tobacco  Allergies  Meds  Med Hx  Surg Hx  Fam Hx  Soc Hx      Reviewed and updated as needed this visit by Provider         All other systems negative except as outline above  OBJECTIVE:  Right foot : tender and swollen and red forefoot.     Ilia was seen today for arthritis.    Diagnoses and all orders for this visit:    Acute gouty arthritis  -     predniSONE (DELTASONE) 10 MG tablet; Take 60 mg days 1 and 2, 50 mg days 3 and 4, 40 mg days 5 and 6, 30 mg days 7 and 8, 20 mg days 9 and 10, 10 mg days 11 and 12  -     Uric acid; Future      Advised supportive and symptomatic treatment.  Follow up with Provider - if condition persists or worsens.

## 2018-06-15 ENCOUNTER — DOCUMENTATION ONLY (OUTPATIENT)
Dept: LAB | Facility: CLINIC | Age: 43
End: 2018-06-15

## 2018-06-15 DIAGNOSIS — N18.30 CKD (CHRONIC KIDNEY DISEASE) STAGE 3, GFR 30-59 ML/MIN (H): Primary | ICD-10-CM

## 2018-06-27 DIAGNOSIS — E78.5 HYPERLIPIDEMIA LDL GOAL <130: ICD-10-CM

## 2018-06-27 DIAGNOSIS — N18.30 CKD (CHRONIC KIDNEY DISEASE) STAGE 3, GFR 30-59 ML/MIN (H): ICD-10-CM

## 2018-06-27 DIAGNOSIS — I10 BENIGN ESSENTIAL HYPERTENSION: ICD-10-CM

## 2018-06-27 RX ORDER — SIMVASTATIN 40 MG
40 TABLET ORAL AT BEDTIME
Qty: 90 TABLET | Refills: 3 | Status: CANCELLED | OUTPATIENT
Start: 2018-06-27

## 2018-06-27 NOTE — TELEPHONE ENCOUNTER
Simvastatin 40mg      Last Written Prescription Date:  10/23/17  Last Fill Quantity: 90,   # refills: 3  Last Office Visit: 05/18/18  Future Office visit:    Next 5 appointments (look out 90 days)     Jul 17, 2018  8:00 AM CDT   Return Visit with Garret Main MD   [facility] ([facility])    [address] [place] [address]   [phone]                   Thank You,  Ninfa Vila, Pharmacy Tech  [place]

## 2018-07-01 ENCOUNTER — NURSE TRIAGE (OUTPATIENT)
Dept: NURSING | Facility: CLINIC | Age: 43
End: 2018-07-01

## 2018-07-01 NOTE — TELEPHONE ENCOUNTER
Pt states he is having a flare up of his gout symptoms. He takes allopurinol 100 mg every day. Pt declined triage. States he just wants Rx for prednisone as he has taken in the past for his gout flare ups.Advised pt call clinic tomorrow AM to discuss w/ PCP. Pt voiced understanding and agreement.  Miriam Rios RN/NALLELY      Reason for Disposition    [1] Caller requesting NON-URGENT health information AND [2] PCP's office is the best resource    Additional Information    Negative: [1] Caller is not with the adult (patient) AND [2] reporting urgent symptoms    Negative: Lab result questions    Negative: Medication questions    Negative: Caller cannot be reached by phone    Negative: Caller has already spoken to PCP or another triager    Negative: RN needs further essential information from caller in order to complete triage    Negative: Requesting regular office appointment    Protocols used: INFORMATION ONLY CALL-[facility]ULTThe [facility]

## 2018-07-02 ENCOUNTER — TELEPHONE (OUTPATIENT)
Dept: FAMILY MEDICINE | Facility: CLINIC | Age: 43
End: 2018-07-02

## 2018-07-02 DIAGNOSIS — M1A.00X0 IDIOPATHIC CHRONIC GOUT WITHOUT TOPHUS, UNSPECIFIED SITE: Primary | ICD-10-CM

## 2018-07-02 DIAGNOSIS — M10.9 ACUTE GOUTY ARTHRITIS: ICD-10-CM

## 2018-07-02 RX ORDER — PREDNISONE 10 MG/1
TABLET ORAL
Qty: 42 TABLET | Refills: 0 | Status: SHIPPED | OUTPATIENT
Start: 2018-07-02 | End: 2018-09-18

## 2018-07-02 NOTE — TELEPHONE ENCOUNTER
Patient calling states he is having problems with his left Knee again, has been seen in the past for this he states. Declined an appointment wondering if a script for prednisone could be called in for this. Please call to discuss.

## 2018-07-02 NOTE — TELEPHONE ENCOUNTER
An rx was routed to his pharmacy. Remind him to see me in the next month or two for a physical and fasting lab work.

## 2018-08-31 ENCOUNTER — RADIANT APPOINTMENT (OUTPATIENT)
Dept: GENERAL RADIOLOGY | Facility: CLINIC | Age: 43
End: 2018-08-31
Attending: PHYSICIAN ASSISTANT
Payer: COMMERCIAL

## 2018-08-31 ENCOUNTER — OFFICE VISIT (OUTPATIENT)
Dept: FAMILY MEDICINE | Facility: CLINIC | Age: 43
End: 2018-08-31
Payer: COMMERCIAL

## 2018-08-31 VITALS
BODY MASS INDEX: 38.04 KG/M2 | DIASTOLIC BLOOD PRESSURE: 80 MMHG | WEIGHT: 257.6 LBS | RESPIRATION RATE: 18 BRPM | OXYGEN SATURATION: 98 % | SYSTOLIC BLOOD PRESSURE: 138 MMHG | TEMPERATURE: 97.8 F | HEART RATE: 65 BPM

## 2018-08-31 DIAGNOSIS — M79.622 AXILLARY PAIN, LEFT: ICD-10-CM

## 2018-08-31 DIAGNOSIS — I12.9 RENAL HYPERTENSION: ICD-10-CM

## 2018-08-31 DIAGNOSIS — Z11.4 SCREENING FOR HIV (HUMAN IMMUNODEFICIENCY VIRUS): Primary | ICD-10-CM

## 2018-08-31 LAB
ANION GAP SERPL CALCULATED.3IONS-SCNC: 7 MMOL/L (ref 3–14)
BASOPHILS # BLD AUTO: 0 10E9/L (ref 0–0.2)
BASOPHILS NFR BLD AUTO: 0.3 %
BUN SERPL-MCNC: 17 MG/DL (ref 7–30)
CALCIUM SERPL-MCNC: 8.7 MG/DL (ref 8.5–10.1)
CHLORIDE SERPL-SCNC: 105 MMOL/L (ref 94–109)
CO2 SERPL-SCNC: 30 MMOL/L (ref 20–32)
CREAT SERPL-MCNC: 1.69 MG/DL (ref 0.66–1.25)
DIFFERENTIAL METHOD BLD: NORMAL
EOSINOPHIL # BLD AUTO: 0.1 10E9/L (ref 0–0.7)
EOSINOPHIL NFR BLD AUTO: 1.6 %
ERYTHROCYTE [DISTWIDTH] IN BLOOD BY AUTOMATED COUNT: 12.9 % (ref 10–15)
GFR SERPL CREATININE-BSD FRML MDRD: 45 ML/MIN/1.7M2
GLUCOSE SERPL-MCNC: 95 MG/DL (ref 70–99)
HCT VFR BLD AUTO: 44.9 % (ref 40–53)
HGB BLD-MCNC: 14.9 G/DL (ref 13.3–17.7)
LYMPHOCYTES # BLD AUTO: 1.7 10E9/L (ref 0.8–5.3)
LYMPHOCYTES NFR BLD AUTO: 27.9 %
MCH RBC QN AUTO: 29.7 PG (ref 26.5–33)
MCHC RBC AUTO-ENTMCNC: 33.2 G/DL (ref 31.5–36.5)
MCV RBC AUTO: 90 FL (ref 78–100)
MONOCYTES # BLD AUTO: 0.4 10E9/L (ref 0–1.3)
MONOCYTES NFR BLD AUTO: 6.3 %
NEUTROPHILS # BLD AUTO: 4 10E9/L (ref 1.6–8.3)
NEUTROPHILS NFR BLD AUTO: 63.9 %
PLATELET # BLD AUTO: 192 10E9/L (ref 150–450)
POTASSIUM SERPL-SCNC: 4 MMOL/L (ref 3.4–5.3)
RBC # BLD AUTO: 5.01 10E12/L (ref 4.4–5.9)
SODIUM SERPL-SCNC: 142 MMOL/L (ref 133–144)
TROPONIN I SERPL-MCNC: <0.015 UG/L (ref 0–0.04)
WBC # BLD AUTO: 6.2 10E9/L (ref 4–11)

## 2018-08-31 PROCEDURE — 99214 OFFICE O/P EST MOD 30 MIN: CPT | Performed by: PHYSICIAN ASSISTANT

## 2018-08-31 PROCEDURE — 84484 ASSAY OF TROPONIN QUANT: CPT | Performed by: PHYSICIAN ASSISTANT

## 2018-08-31 PROCEDURE — 93000 ELECTROCARDIOGRAM COMPLETE: CPT | Performed by: PHYSICIAN ASSISTANT

## 2018-08-31 PROCEDURE — 85025 COMPLETE CBC W/AUTO DIFF WBC: CPT | Performed by: PHYSICIAN ASSISTANT

## 2018-08-31 PROCEDURE — 87389 HIV-1 AG W/HIV-1&-2 AB AG IA: CPT | Performed by: PHYSICIAN ASSISTANT

## 2018-08-31 PROCEDURE — 36415 COLL VENOUS BLD VENIPUNCTURE: CPT | Performed by: PHYSICIAN ASSISTANT

## 2018-08-31 PROCEDURE — 80048 BASIC METABOLIC PNL TOTAL CA: CPT | Performed by: PHYSICIAN ASSISTANT

## 2018-08-31 PROCEDURE — 72040 X-RAY EXAM NECK SPINE 2-3 VW: CPT

## 2018-08-31 NOTE — MR AVS SNAPSHOT
After Visit Summary   8/31/2018    Ilia Arnold    MRN: [id number]           Patient Information     Date Of Birth          1975        Visit Information        Provider Department      8/31/2018 1:40 PM Lazaro Ty PA-C [facility]        Today's Diagnoses     Screening for HIV (human immunodeficiency virus)    -  1    Axillary pain, left        Renal hypertension           Follow-ups after your visit        Your next 10 appointments already scheduled     Sep 18, 2018  3:30 PM CDT   Return Visit with Garret Main MD   [facility]    [address] [place] [id number] [phone]              Future tests that were ordered for you today     Open Future Orders        Priority Expected Expires Ordered    Lipid panel reflex to direct LDL Fasting Routine  11/29/2018 8/31/2018    Albumin Random Urine Quantitative with Creat Ratio Routine  11/29/2018 8/31/2018            Who to contact     Normal or non-critical lab and imaging results will be communicated to you by ZIPDIGShart, letter or phone within 4 business days after the clinic has received the results. If you do not hear from us within 7 days, please contact the clinic through FIXOt or phone. If you have a critical or abnormal lab result, we will notify you by phone as soon as possible.  Submit refill requests through [employer] or call your pharmacy and they will forward the refill request to us. Please allow 3 business days for your refill to be completed.          If you need to speak with a  for additional information , please call: [phone]             Additional Information About Your Visit        [employer] Information     [employer] gives you secure access to your electronic health record. If you see a primary care provider, you can also send messages to your care team and make appointments. If you have questions, please call your  primary care clinic.  If you do not have a primary care provider, please call [phone] and they will assist you.        Care EveryWhere ID     This is your Care EveryWhere ID. This could be used by other organizations to access your [place] medical records  [id number]        Your Vitals Were     Pulse Temperature Respirations Pulse Oximetry BMI (Body Mass Index)       65 97.8  F (36.6  C) (Tympanic) 18 98% 38.04 kg/m2        Blood Pressure from Last 3 Encounters:   08/31/18 138/80   05/18/18 136/89   04/05/18 123/80    Weight from Last 3 Encounters:   08/31/18 257 lb 9.6 oz (116.8 kg)   05/18/18 256 lb (116.1 kg)   04/05/18 256 lb (116.1 kg)              We Performed the Following     Basic metabolic panel  (Ca, Cl, CO2, Creat, Gluc, K, Na, BUN)     CBC with platelets differential     EKG 12-lead complete w/read - Clinics     HIV Screening     Troponin I        Primary Care Provider Office Phone # Fax #    Lazaro Ty PA-C [phone]       [address] [facility] [address]        Equal Access to Services     DIANA DORMAN AH: Hadii aad ku hadasho Soomaali, waaxda luqadaha, qaybta kaalmada adeegyada, madi maxwelln [facility]in. So [facility] [phone].    ATENCIÓN: Si habla español, tiene a mancia disposición servicios gratuitos de asistencia lingüística. [facility] [phone].    We comply with applicable federal civil rights laws and [place] laws. We do not discriminate on the basis of race, color, national origin, age, disability, sex, sexual orientation, or gender identity.            Thank you!     Thank you for choosing [facility]  for your care. Our goal is always to provide you with excellent care. Hearing back from our patients is one way we can continue to improve our services. Please take a few minutes to complete the written survey that you may receive in the mail after your visit with us. Thank you!             Your Updated Medication List - Protect  others around you: Learn how to safely use, store and throw away your medicines at www.disposemymeds.org.          This list is accurate as of 8/31/18  3:23 PM.  Always use your most recent med list.                   Brand Name Dispense Instructions for use Diagnosis    allopurinol 100 MG tablet    ZYLOPRIM    90 tablet    Take 1 tablet (100 mg) by mouth daily    Acute gouty arthritis       cholecalciferol 1000 UNIT tablet    vitamin D3    100 tablet    Take 1 tablet (1,000 Units) by mouth daily Start after ergocalciferol therapy complete    Vitamin D deficiency       colchicine 0.6 MG tablet    COLCRYS    30 tablet    Take 2 tabs (1.2mg) then 1 tab (0.6mg) 1 hour later.  Wait 12 hours then take daily until flare resolves.    Acute gouty arthritis       losartan 25 MG tablet    COZAAR    90 tablet    Take 1 tablet (25 mg) by mouth daily    Benign essential hypertension       predniSONE 10 MG tablet    DELTASONE    42 tablet    Take 60 mg days 1 and 2, 50 mg days 3 and 4, 40 mg days 5 and 6, 30 mg days 7 and 8, 20 mg days 9 and 10, 10 mg days 11 and 12    Acute gouty arthritis       simvastatin 40 MG tablet    ZOCOR    90 tablet    Take 1 tablet (40 mg) by mouth At Bedtime    Benign essential hypertension, CKD (chronic kidney disease) stage 3, GFR 30-59 ml/min, Hyperlipidemia LDL goal <130

## 2018-08-31 NOTE — LETTER
September 17, 2018      Ilia Arnold   [address] [place] [address]        Dear ,    We are writing to inform you of your test results.    Your recent kidney function came back showing improvement as compared to 6 mos ago. The rest of your lab work came back nice and normal.    Resulted Orders   HIV Screening   Result Value Ref Range    HIV Antigen Antibody Combo Nonreactive NR^Nonreactive          Comment:      HIV-1 p24 Ag & HIV-1/HIV-2 Ab Not Detected   CBC with platelets differential   Result Value Ref Range    WBC 6.2 4.0 - 11.0 10e9/L    RBC Count 5.01 4.4 - 5.9 10e12/L    Hemoglobin 14.9 13.3 - 17.7 g/dL    Hematocrit 44.9 40.0 - 53.0 %    MCV 90 78 - 100 fl    MCH 29.7 26.5 - 33.0 pg    MCHC 33.2 31.5 - 36.5 g/dL    RDW 12.9 10.0 - 15.0 %    Platelet Count 192 150 - 450 10e9/L    Diff Method Automated Method     % Neutrophils 63.9 %    % Lymphocytes 27.9 %    % Monocytes 6.3 %    % Eosinophils 1.6 %    % Basophils 0.3 %    Absolute Neutrophil 4.0 1.6 - 8.3 10e9/L    Absolute Lymphocytes 1.7 0.8 - 5.3 10e9/L    Absolute Monocytes 0.4 0.0 - 1.3 10e9/L    Absolute Eosinophils 0.1 0.0 - 0.7 10e9/L    Absolute Basophils 0.0 0.0 - 0.2 10e9/L   Basic metabolic panel  (Ca, Cl, CO2, Creat, Gluc, K, Na, BUN)   Result Value Ref Range    Sodium 142 133 - 144 mmol/L    Potassium 4.0 3.4 - 5.3 mmol/L    Chloride 105 94 - 109 mmol/L    Carbon Dioxide 30 20 - 32 mmol/L    Anion Gap 7 3 - 14 mmol/L    Glucose 95 70 - 99 mg/dL      Comment:      Non Fasting    Urea Nitrogen 17 7 - 30 mg/dL    Creatinine 1.69 (H) 0.66 - 1.25 mg/dL    GFR Estimate 45 (L) >60 mL/min/1.7m2      Comment:      Non  GFR Calc    GFR Estimate If Black 54 (L) >60 mL/min/1.7m2      Comment:       GFR Calc    Calcium 8.7 8.5 - 10.1 mg/dL   Troponin I   Result Value Ref Range    Troponin I ES <0.015 0.000 - 0.045 ug/L      Comment:      The 99th percentile for upper reference range is 0.045 ug/L.   Troponin values   in the range of 0.045 - 0.120 ug/L may be associated with risks of adverse   clinical events.         If you have any questions or concerns, please call the clinic at the number listed above.       Sincerely,        Lazaro Ty PA-C/yuliya

## 2018-08-31 NOTE — PROGRESS NOTES
SUBJECTIVE:   Ilia Arnold is a 42 year old male who presents to clinic today for the following health issues:      Musculoskeletal problem/pain      Duration: x3 days    Description  Location: left side under arm pit    Intensity:  mild    Accompanying signs and symptoms: radiation of pain to left arm    History  Previous similar problem: YES- 5yrs ago  Previous evaluation:  EKG    Precipitating or alleviating factors:  Trauma or overuse: no   Aggravating factors include: none    Therapies tried and outcome: stretching and Aleve    No pain with movement.   Some pressure like pain in the axilla and upper arm no distinctive chest pain . No sob/palps. No sweats or nausea. No neck pain. Symptoms x 3 days. No obvious injury. Some mild paresthesias . No headache or dizziness or weakness. No rash. Lifted a lot of kegs today and this did not make the chest pain any worse/.  PROBLEMS TO ADD ON...  None  -------------------------------------    Problem list and histories reviewed & adjusted, as indicated.  Additional history: as documented    Patient Active Problem List   Diagnosis     Acute gouty arthritis     Erectile dysfunction     Hyperlipidemia LDL goal <130     Renal hypertension     CKD (chronic kidney disease) stage 3, GFR 30-59 ml/min     Proteinuria     Idiopathic chronic gout without tophus, unspecified site     Benign essential hypertension     Gastroesophageal reflux disease without esophagitis     Panic attack     Secondary renal hyperparathyroidism (H)     Morbid obesity (H)     Past Surgical History:   Procedure Laterality Date     NO HISTORY OF SURGERY         Social History   Substance Use Topics     Smoking status: Never Smoker     Smokeless tobacco: Never Used     Alcohol use 2.0 oz/week     4 Standard drinks or equivalent per week      Comment: Periods of heavy etoh use but not routinely.      Family History   Problem Relation Age of Onset     Lipids Mother      C.A.D. Paternal Grandmother 65      cabg     Hypertension Paternal Grandmother      Lipids Brother      Cerebrovascular Disease No family hx of      Cancer - colorectal No family hx of      Prostate Cancer No family hx of          Current Outpatient Prescriptions   Medication Sig Dispense Refill     allopurinol (ZYLOPRIM) 100 MG tablet Take 1 tablet (100 mg) by mouth daily 90 tablet 3     losartan (COZAAR) 25 MG tablet Take 1 tablet (25 mg) by mouth daily 90 tablet 3     simvastatin (ZOCOR) 40 MG tablet Take 1 tablet (40 mg) by mouth At Bedtime 90 tablet 3     cholecalciferol (VITAMIN D3) 1000 UNIT tablet Take 1 tablet (1,000 Units) by mouth daily Start after ergocalciferol therapy complete 100 tablet 3     colchicine (COLCRYS) 0.6 MG tablet Take 2 tabs (1.2mg) then 1 tab (0.6mg) 1 hour later.  Wait 12 hours then take daily until flare resolves. 30 tablet 0     predniSONE (DELTASONE) 10 MG tablet Take 60 mg days 1 and 2, 50 mg days 3 and 4, 40 mg days 5 and 6, 30 mg days 7 and 8, 20 mg days 9 and 10, 10 mg days 11 and 12 42 tablet 0     No Known Allergies  Recent Labs   Lab Test  02/20/18   1444  10/23/17   1543  06/30/17   1043  05/02/16   0933  06/25/15   0931  01/15/15   0825   01/25/13   1042  11/07/12   1157   07/02/10   0959   A1C   --    --    --    --    --   5.4   --    --   5.4   --    --    LDL   --    --   230*  189*   --   150*   --   96   --    < >  154*   HDL   --    --   52  55   --   43   --   38*   --    < >  42   TRIG   --    --   174*  166*   --   335*   --   161*   --    < >  279*   ALT   --    --    --   29  30   --    --   38   --    < >  22   CR  2.00*  1.73*  1.65*  1.48*  1.56*  1.52*   < >  1.74*   --    < >  1.51*   GFRESTIMATED  37*  44*  46*  52*  50*  51*   < >  44*   --    < >  53*   GFRESTBLACK  44*  53*  56*  63  60*  62   < >  54*   --    < >  64   POTASSIUM  4.8  Canceled, Test credited  4.3  4.6  4.3  4.1   < >  4.6   --    < >  4.3   TSH   --    --    --    --    --    --    --    --    --    --   2.84    < > =  values in this interval not displayed.      BP Readings from Last 3 Encounters:   08/31/18 138/80   05/18/18 136/89   04/05/18 123/80    Wt Readings from Last 3 Encounters:   08/31/18 257 lb 9.6 oz (116.8 kg)   05/18/18 256 lb (116.1 kg)   04/05/18 256 lb (116.1 kg)                  Labs reviewed in EPIC    Reviewed and updated as needed this visit by clinical staff  Allergies       Reviewed and updated as needed this visit by Provider         All other systems negative except as outline above  OBJECTIVE:  Eye exam - right eye normal lid, conjunctiva, cornea, pupil and fundus, left eye normal lid, conjunctiva, cornea, pupil and fundus.  Thyroid not palpable, not enlarged, no nodules detected.  CHEST:chest clear to IPPA, no tachypnea, retractions or cyanosis and S1, S2 normal, no murmur, no gallop, rate regular.  No rash.  Mild left sided cervical trigger point.  Bilateral upper extremity strength rom and dtr's normal and symmetric.   No axillary adenopathy.    Ilia was seen today for musculoskeletal problem.    Diagnoses and all orders for this visit:    Screening for HIV (human immunodeficiency virus)  -     HIV Screening    Axillary pain, left  -     CBC with platelets differential  -     Troponin I  -     EKG 12-lead complete w/read - Clinics  -     XR Cervical Spine 2/3 Views; Future    Renal hypertension  -     Lipid panel reflex to direct LDL Fasting; Future  -     Albumin Random Urine Quantitative with Creat Ratio; Future  -     Basic metabolic panel  (Ca, Cl, CO2, Creat, Gluc, K, Na, BUN)    Other orders  -     Cancel: Basic metabolic panel - FUTURE  S+90; Future    query a pinched nerve.   work on lifestyle modification  Advised supportive and symptomatic treatment.  Follow up with Provider - if condition persists or worsens.

## 2018-09-04 LAB — HIV 1+2 AB+HIV1 P24 AG SERPL QL IA: NONREACTIVE

## 2018-09-11 ENCOUNTER — TELEPHONE (OUTPATIENT)
Dept: NEPHROLOGY | Facility: CLINIC | Age: 43
End: 2018-09-11

## 2018-09-11 NOTE — TELEPHONE ENCOUNTER
Attempted to contact pt.  No answer.  Message left on voicemail to return call to [employer] [place] at [phone].  Lab appt needed prior to seeing Dr. Main on 09/18/18 at 3:30pm.    Gabby Miller LPN

## 2018-09-17 ENCOUNTER — TELEPHONE (OUTPATIENT)
Dept: NEPHROLOGY | Facility: CLINIC | Age: 43
End: 2018-09-17

## 2018-09-17 NOTE — TELEPHONE ENCOUNTER
Added this message to encounter that is already open.     Manuela Tamez, RN, BSN  Nephrology Care Coordinator  [facility]

## 2018-09-17 NOTE — TELEPHONE ENCOUNTER
[facility] Call Center     Phone Message     May a detailed message be left on voicemail: no     Reason for Call: Other: Pt returning phone call from last week.  Please call.       Action Taken: Message routed to:  Adult Clinics: Nephrology p [phone]

## 2018-09-17 NOTE — TELEPHONE ENCOUNTER
Ilia returned call to the Nephrology Clinic.  Informed patient that he need to visit the lab prior to his visit with Dr. Main and the labs were at 3 pm.  Thank you.

## 2018-09-17 NOTE — TELEPHONE ENCOUNTER
2nd attempt to contact pt.  No answer.  Message left to arrive 1/2 hr prior to appt with Dr. Main.   Encouraged to call [phone] if any further questions.  Lab appt scheduled    Gabby Miller LPN

## 2018-09-17 NOTE — TELEPHONE ENCOUNTER
Attempted return call to pt.  No answer.  Message left to return call to [employer] [place] at  [phone] for further information.    Gabby Miller LPN

## 2018-09-17 NOTE — TELEPHONE ENCOUNTER
[facility] Call Center    Phone Message    May a detailed message be left on voicemail: no    Reason for Call: Other: Pt returning phone call from last week.  Please call.      Action Taken: Message routed to:  Adult Clinics: Nephrology p [phone]

## 2018-09-18 ENCOUNTER — OFFICE VISIT (OUTPATIENT)
Dept: NEPHROLOGY | Facility: CLINIC | Age: 43
End: 2018-09-18
Payer: COMMERCIAL

## 2018-09-18 VITALS
SYSTOLIC BLOOD PRESSURE: 133 MMHG | BODY MASS INDEX: 35.49 KG/M2 | DIASTOLIC BLOOD PRESSURE: 79 MMHG | WEIGHT: 253.5 LBS | HEART RATE: 61 BPM | HEIGHT: 71 IN

## 2018-09-18 DIAGNOSIS — N18.30 CKD (CHRONIC KIDNEY DISEASE) STAGE 3, GFR 30-59 ML/MIN (H): ICD-10-CM

## 2018-09-18 DIAGNOSIS — E66.01 MORBID OBESITY (H): ICD-10-CM

## 2018-09-18 DIAGNOSIS — I10 BENIGN ESSENTIAL HYPERTENSION: ICD-10-CM

## 2018-09-18 DIAGNOSIS — N18.30 CKD (CHRONIC KIDNEY DISEASE) STAGE 3, GFR 30-59 ML/MIN (H): Primary | ICD-10-CM

## 2018-09-18 DIAGNOSIS — M1A.00X0 IDIOPATHIC CHRONIC GOUT WITHOUT TOPHUS, UNSPECIFIED SITE: ICD-10-CM

## 2018-09-18 LAB
ALBUMIN SERPL-MCNC: 3.7 G/DL (ref 3.4–5)
ALBUMIN UR-MCNC: 100 MG/DL
ANION GAP SERPL CALCULATED.3IONS-SCNC: 5 MMOL/L (ref 3–14)
APPEARANCE UR: CLEAR
BILIRUB UR QL STRIP: NEGATIVE
BUN SERPL-MCNC: 19 MG/DL (ref 7–30)
CALCIUM SERPL-MCNC: 8.9 MG/DL (ref 8.5–10.1)
CHLORIDE SERPL-SCNC: 106 MMOL/L (ref 94–109)
CO2 SERPL-SCNC: 31 MMOL/L (ref 20–32)
COLOR UR AUTO: ABNORMAL
CREAT SERPL-MCNC: 1.84 MG/DL (ref 0.66–1.25)
CREAT UR-MCNC: 85 MG/DL
GFR SERPL CREATININE-BSD FRML MDRD: 40 ML/MIN/1.7M2
GLUCOSE SERPL-MCNC: 79 MG/DL (ref 70–99)
GLUCOSE UR STRIP-MCNC: NEGATIVE MG/DL
HGB BLD-MCNC: 16.1 G/DL (ref 13.3–17.7)
HGB UR QL STRIP: NEGATIVE
KETONES UR STRIP-MCNC: NEGATIVE MG/DL
LEUKOCYTE ESTERASE UR QL STRIP: NEGATIVE
NITRATE UR QL: NEGATIVE
PH UR STRIP: 6 PH (ref 5–7)
PHOSPHATE SERPL-MCNC: 3.7 MG/DL (ref 2.5–4.5)
POTASSIUM SERPL-SCNC: 4.3 MMOL/L (ref 3.4–5.3)
PROT UR-MCNC: 1.09 G/L
PROT/CREAT 24H UR: 1.28 G/G CR (ref 0–0.2)
PTH-INTACT SERPL-MCNC: 50 PG/ML (ref 18–80)
RBC #/AREA URNS AUTO: NORMAL /HPF
SODIUM SERPL-SCNC: 142 MMOL/L (ref 133–144)
SOURCE: ABNORMAL
SP GR UR STRIP: 1.01 (ref 1–1.03)
UROBILINOGEN UR STRIP-MCNC: NORMAL MG/DL (ref 0–2)
WBC #/AREA URNS AUTO: NORMAL /HPF

## 2018-09-18 PROCEDURE — 84156 ASSAY OF PROTEIN URINE: CPT | Performed by: INTERNAL MEDICINE

## 2018-09-18 PROCEDURE — 81001 URINALYSIS AUTO W/SCOPE: CPT | Performed by: INTERNAL MEDICINE

## 2018-09-18 PROCEDURE — 85018 HEMOGLOBIN: CPT | Performed by: INTERNAL MEDICINE

## 2018-09-18 PROCEDURE — 83970 ASSAY OF PARATHORMONE: CPT | Performed by: INTERNAL MEDICINE

## 2018-09-18 PROCEDURE — 99214 OFFICE O/P EST MOD 30 MIN: CPT | Performed by: INTERNAL MEDICINE

## 2018-09-18 PROCEDURE — 80069 RENAL FUNCTION PANEL: CPT | Performed by: INTERNAL MEDICINE

## 2018-09-18 PROCEDURE — 36415 COLL VENOUS BLD VENIPUNCTURE: CPT | Performed by: INTERNAL MEDICINE

## 2018-09-18 RX ORDER — LOSARTAN POTASSIUM 50 MG/1
50 TABLET ORAL DAILY
Qty: 90 TABLET | Refills: 3 | Status: SHIPPED | OUTPATIENT
Start: 2018-09-18 | End: 2019-10-14

## 2018-09-18 ASSESSMENT — PAIN SCALES - GENERAL: PAINLEVEL: NO PAIN (0)

## 2018-09-18 NOTE — NURSING NOTE
"Ilia Arnold's goals for this visit include: return  He requests these members of his care team be copied on today's visit information:     PCP: Lazaro Ty    Referring Provider:  No referring provider defined for this encounter.    /79  Pulse 61  Ht 1.803 m (5' 11\")  Wt 115 kg (253 lb 8 oz)  BMI 35.36 kg/m2    Do you need any medication refills at today's visit? n  "

## 2018-09-18 NOTE — PATIENT INSTRUCTIONS
1. Increase losartan to 50 mg daily  2. Repeat labs in 2 week ([facility])  3. Labs in 6 months ([facility])  4. Follow-up in one year

## 2018-10-08 PROBLEM — N25.81 SECONDARY RENAL HYPERPARATHYROIDISM (H): Status: RESOLVED | Noted: 2018-02-20 | Resolved: 2018-10-08

## 2018-10-09 NOTE — PROGRESS NOTES
9/18/18    CC: proteinuria/FSGS    HPI: Ilia Arnold is a 43 year old male who presents for follow-up of FSGS. At the time of his first visit, his CKD risk factors included hypertension (few years), NSAID use, obesity. He was found to have an elevated creatinine, proteinuria and thus biopsy was pursued on 3/18/13 which showed findings most consistent with FSGS with some mild arteriolar thickening; <10% intersitial fibrosis. Unfortunately no EM was performed due to limited tissue in the setting of slightly difficult biopsy.    Creatinine has been 1.37-2 since 2010; 1.84 now with a correlating GFR of 40. His last UPCR was 0.76 g/g. He is currently taking losartan 25 mg daily. He was lost to follow-up as recommended but presents againtoday. He reports that he had inflammatory related issues this summer - knees and ankles - no rash. He started allopurinol and joint pain has improved. Weight was 277 lbs in august and Is now 253 lbs.      No Known Allergies      Current Outpatient Prescriptions on File Prior to Visit:  allopurinol (ZYLOPRIM) 100 MG tablet Take 1 tablet (100 mg) by mouth daily   simvastatin (ZOCOR) 40 MG tablet Take 1 tablet (40 mg) by mouth At Bedtime     No current facility-administered medications on file prior to visit.     Past Medical History:   Diagnosis Date     Gout      Hyperlipidemia LDL goal < 160      Hypertension        Past Surgical History:   Procedure Laterality Date     NO HISTORY OF SURGERY         Social History   Substance Use Topics     Smoking status: Never Smoker     Smokeless tobacco: Never Used     Alcohol use 2.0 oz/week     4 Standard drinks or equivalent per week      Comment: Periods of heavy etoh use but not routinely.        Family History   Problem Relation Age of Onset     Lipids Mother      C.A.D. Paternal Grandmother 65     cabg     Hypertension Paternal Grandmother      Lipids Brother      Cerebrovascular Disease No family hx of      Cancer - colorectal No family hx  "of      Prostate Cancer No family hx of        ROS: A 4 system review of systems was negative other than noted here or above.     Exam:  /79  Pulse 61  Ht 1.803 m (5' 11\")  Wt 115 kg (253 lb 8 oz)  BMI 35.36 kg/m2    GENERAL APPEARANCE: alert and no distress  RESP: lungs clear to auscultation   CV: regular rhythm, normal rate, no rub  Extremities: no clubbing, cyanosis, or edema   SKIN: no rash  NEURO: mentation intact and speech normal  PSYCH: affect normal/bright    Results  Office Visit on 09/18/2018   Component Date Value Ref Range Status     Color Urine 09/18/2018 Light Yellow   Final     Appearance Urine 09/18/2018 Clear   Final     Glucose Urine 09/18/2018 Negative  NEG^Negative mg/dL Final     Bilirubin Urine 09/18/2018 Negative  NEG^Negative Final     Ketones Urine 09/18/2018 Negative  NEG^Negative mg/dL Final     Specific Gravity Urine 09/18/2018 1.010  1.003 - 1.035 Final     Blood Urine 09/18/2018 Negative  NEG^Negative Final     pH Urine 09/18/2018 6.0  5.0 - 7.0 pH Final     Protein Albumin Urine 09/18/2018 100* NEG^Negative mg/dL Final     Urobilinogen mg/dL 09/18/2018 Normal  0.0 - 2.0 mg/dL Final     Nitrite Urine 09/18/2018 Negative  NEG^Negative Final     Leukocyte Esterase Urine 09/18/2018 Negative  NEG^Negative Final     Source 09/18/2018 Clean catch urine   Final     WBC Urine 09/18/2018 0 - 5  OTO5^0 - 5 /HPF Final     RBC Urine 09/18/2018 O - 2  OTO2^O - 2 /HPF Final   Orders Only on 09/18/2018   Component Date Value Ref Range Status     Hemoglobin 09/18/2018 16.1  13.3 - 17.7 g/dL Final     Parathyroid Hormone Intact 09/18/2018 50  18 - 80 pg/mL Final     Protein Random Urine 09/18/2018 1.09  g/L Final     Protein Total Urine g/gr Creatinine 09/18/2018 1.28* 0 - 0.2 g/g Cr Final     Sodium 09/18/2018 142  133 - 144 mmol/L Final     Potassium 09/18/2018 4.3  3.4 - 5.3 mmol/L Final     Chloride 09/18/2018 106  94 - 109 mmol/L Final     Carbon Dioxide 09/18/2018 31  20 - 32 mmol/L " Final     Anion Gap 09/18/2018 5  3 - 14 mmol/L Final     Glucose 09/18/2018 79  70 - 99 mg/dL Final     Urea Nitrogen 09/18/2018 19  7 - 30 mg/dL Final     Creatinine 09/18/2018 1.84* 0.66 - 1.25 mg/dL Final     GFR Estimate 09/18/2018 40* >60 mL/min/1.7m2 Final    Non  GFR Calc     GFR Estimate If Black 09/18/2018 49* >60 mL/min/1.7m2 Final    African American GFR Calc     Calcium 09/18/2018 8.9  8.5 - 10.1 mg/dL Final     Phosphorus 09/18/2018 3.7  2.5 - 4.5 mg/dL Final     Albumin 09/18/2018 3.7  3.4 - 5.0 g/dL Final     Creatinine Urine 09/18/2018 85  mg/dL Final         Assessment/Plan:  1. FSGS/CKD Stage 3: findings on biopsy are consistent with FSGS, however, unfortunately EM was not available due to limited sample. I have reviewed his biopsy with our current renal pathologist and findings suggest a secondary FSGS.  His low level proteinuria and non-nephrotic presentation lean toward a secondary process as well. He is currently on losartan 25 mg daily - increasing losartan to 50 mg daily and plan to repeat labs in a few weeks.    2. Hypertension: at goal but will increaes losartan to 50 mg daily for addt protein lowering effect. Given proteinuria, want to assure his blood pressure is consistently less than 130/80.     3. Obesity: educated on benefits of weight loss. He has had some recent success with weight loss.     4. Gout: now on allopurinol.     Patient Instructions   1. Increase losartan to 50 mg daily  2. Repeat labs in 2 week ([facility])  3. Labs in 6 months ([facility])  4. Follow-up in one year       Garret Main DO

## 2018-10-10 ENCOUNTER — OFFICE VISIT (OUTPATIENT)
Dept: FAMILY MEDICINE | Facility: CLINIC | Age: 43
End: 2018-10-10
Payer: COMMERCIAL

## 2018-10-10 VITALS
TEMPERATURE: 97.7 F | SYSTOLIC BLOOD PRESSURE: 137 MMHG | BODY MASS INDEX: 35.84 KG/M2 | RESPIRATION RATE: 12 BRPM | WEIGHT: 257 LBS | DIASTOLIC BLOOD PRESSURE: 83 MMHG | OXYGEN SATURATION: 98 % | HEART RATE: 77 BPM

## 2018-10-10 DIAGNOSIS — N52.9 ERECTILE DYSFUNCTION, UNSPECIFIED ERECTILE DYSFUNCTION TYPE: Primary | ICD-10-CM

## 2018-10-10 DIAGNOSIS — N18.30 CKD (CHRONIC KIDNEY DISEASE) STAGE 3, GFR 30-59 ML/MIN (H): ICD-10-CM

## 2018-10-10 DIAGNOSIS — E78.5 HYPERLIPIDEMIA LDL GOAL <130: ICD-10-CM

## 2018-10-10 DIAGNOSIS — I10 BENIGN ESSENTIAL HYPERTENSION: ICD-10-CM

## 2018-10-10 PROCEDURE — 99214 OFFICE O/P EST MOD 30 MIN: CPT | Performed by: PHYSICIAN ASSISTANT

## 2018-10-10 RX ORDER — SIMVASTATIN 40 MG
40 TABLET ORAL AT BEDTIME
Qty: 90 TABLET | Refills: 3 | Status: SHIPPED | OUTPATIENT
Start: 2018-10-10 | End: 2020-01-03

## 2018-10-10 RX ORDER — SILDENAFIL CITRATE 20 MG/1
TABLET ORAL
Qty: 30 TABLET | Refills: 11 | Status: SHIPPED | OUTPATIENT
Start: 2018-10-10 | End: 2020-01-03

## 2018-10-10 NOTE — MR AVS SNAPSHOT
After Visit Summary   10/10/2018    Ilia Arnold    MRN: [id number]           Patient Information     Date Of Birth          1975        Visit Information        Provider Department      10/10/2018 1:00 PM Lazaro Ty PA-C [facility]        Today's Diagnoses     Erectile dysfunction, unspecified erectile dysfunction type    -  1    Benign essential hypertension        CKD (chronic kidney disease) stage 3, GFR 30-59 ml/min (H)        Hyperlipidemia LDL goal <130           Follow-ups after your visit        Your next 10 appointments already scheduled     Mar 20, 2019  9:30 AM CDT   LAB with LAB FIRST FLOOR [facility] ([facility])    [address] [place] [id number]   [phone]           Please do not eat 10-12 hours before your appointment if you are coming in fasting for labs on lipids, cholesterol, or glucose (sugar). This does not apply to pregnant women. Water, hot tea and black coffee (with nothing added) are okay. Do not drink other fluids, diet soda or chew gum.            Sep 09, 2019  9:30 AM CDT   LAB with LAB FIRST FLOOR [facility] ([facility])    [address] [place] [id number] [phone]           Please do not eat 10-12 hours before your appointment if you are coming in fasting for labs on lipids, cholesterol, or glucose (sugar). This does not apply to pregnant women. Water, hot tea and black coffee (with nothing added) are okay. Do not drink other fluids, diet soda or chew gum.            Sep 09, 2019 10:00 AM CDT   Return Visit with Garret Main MD   [facility]    [address] [place] [id number] [phone]              Who to contact     Normal or non-critical lab and imaging results will be communicated to you by MyChart, letter or phone within 4 business days after  "the clinic has received the results. If you do not hear from us within 7 days, please contact the clinic through [facility] or phone. If you have a critical or abnormal lab result, we will notify you by phone as soon as possible.  Submit refill requests through [facility] or call your pharmacy and they will forward the refill request to us. Please allow 3 business days for your refill to be completed.          If you need to speak with a  for additional information , please call: [phone]             Additional Information About Your Visit        [facility] Information     [facility] lets you send messages to your doctor, view your test results, renew your prescriptions, schedule appointments and more. To sign up, go to [email or web address] . Click on \"Log in\" on the left side of the screen, which will take you to the Welcome page. Then click on \"Sign up Now\" on the right side of the page.     You will be asked to enter the access code listed below, as well as some personal information. Please follow the directions to create your username and password.     Your access code is: [email or web address]  Expires: 2019  1:29 PM     Your access code will  in 90 days. If you need help or a new code, please call your [facility] clinic or [phone].        Care EveryWhere ID     This is your Care EveryWhere ID. This could be used by other organizations to access your [facility] medical records  [id number]        Your Vitals Were     Pulse Temperature Respirations Pulse Oximetry BMI (Body Mass Index)       77 97.7  F (36.5  C) (Tympanic) 12 98% 35.84 kg/m2        Blood Pressure from Last 3 Encounters:   10/10/18 137/83   18 133/79   18 138/80    Weight from Last 3 Encounters:   10/10/18 257 lb (116.6 kg)   18 253 lb 8 oz (115 kg)   18 257 lb 9.6 oz (116.8 kg)              Today, you had the following     No orders found for display         Today's Medication Changes          These changes " are accurate as of 10/10/18  1:29 PM.  If you have any questions, ask your nurse or doctor.               Start taking these medicines.        Dose/Directions    sildenafil 20 MG tablet   Commonly known as:  REVATIO   Used for:  Erectile dysfunction, unspecified erectile dysfunction type   Started by:  Lazaro Ty PA-C        Take 2-5 tabs daily prn   Quantity:  30 tablet   Refills:  11            Where to get your medicines      These medications were sent to [facility] - [address]  [address] [address] [address]     Phone:  [phone]     sildenafil 20 MG tablet    simvastatin 40 MG tablet                Primary Care Provider Office Phone # Fax #    Lazaro Ty PA-C [phone] [address]  [place] [address]        Equal Access to Services     [facility]: Hadii aad ku hadasho Soomaali, waaxda luqadaha, qaybta kaalmada adeegyada, waxay idiin hayaan adevicenta kharacayetano yanes . So [facility] [phone].    ATENCIÓN: Si habla español, tiene a mancia disposición servicios gratuitos de asistencia lingüística. Llame al [phone].    We comply with applicable federal civil rights laws and [place] laws. We do not discriminate on the basis of race, color, national origin, age, disability, sex, sexual orientation, or gender identity.            Thank you!     Thank you for choosing [facility]  for your care. Our goal is always to provide you with excellent care. Hearing back from our patients is one way we can continue to improve our services. Please take a few minutes to complete the written survey that you may receive in the mail after your visit with us. Thank you!             Your Updated Medication List - Protect others around you: Learn how to safely use, store and throw away your medicines at www.disposemymeds.org.          This list is accurate as of 10/10/18  1:29 PM.  Always use your most recent med list.                    Brand Name Dispense Instructions for use Diagnosis    allopurinol 100 MG tablet    ZYLOPRIM    90 tablet    Take 1 tablet (100 mg) by mouth daily    Acute gouty arthritis       losartan 50 MG tablet    COZAAR    90 tablet    Take 1 tablet (50 mg) by mouth daily    Benign essential hypertension       sildenafil 20 MG tablet    REVATIO    30 tablet    Take 2-5 tabs daily prn    Erectile dysfunction, unspecified erectile dysfunction type       simvastatin 40 MG tablet    ZOCOR    90 tablet    Take 1 tablet (40 mg) by mouth At Bedtime    Benign essential hypertension, CKD (chronic kidney disease) stage 3, GFR 30-59 ml/min (H), Hyperlipidemia LDL goal <130

## 2018-10-10 NOTE — PROGRESS NOTES
SUBJECTIVE:   Ilia Arnold is a 43 year old male who presents to clinic today for the following health issues:      Hyperlipidemia Follow-Up      Rate your low fat/cholesterol diet?: good    Taking statin?  Yes, no muscle aches from statin    Other lipid medications/supplements?:  none      Amount of exercise or physical activity: 1 day/week for an average of 45-60 minutes    Problems taking medications regularly: No    Medication side effects: none    Diet: low fat/cholesterol    Hypertension follow up  BP: not checked  Side effects: none  Diet: no low salt diet  Medication is helping symptoms    Refill on losartan    Gout- recheck: gout doing well. No flares  Description   Location: knee and ankles - bilateral (occassional)  Joint Swelling: no  Redness: no  Pain intensity:  1/10   Accompanying signs and symptoms: None  History  Previous history of gout: YES   Trauma to the area: no   Precipitating factors:   Alcohol usage: Occassional  Diuretic use: no   Recent illness: no   Therapies tried and outcome: None    Pt wants to talk about weaning off of allopurinol.    Problem list and histories reviewed & adjusted, as indicated.  Additional history: as documented    Patient Active Problem List   Diagnosis     Acute gouty arthritis     Erectile dysfunction     Hyperlipidemia LDL goal <130     Renal hypertension     CKD (chronic kidney disease) stage 3, GFR 30-59 ml/min (H)     Proteinuria     Idiopathic chronic gout without tophus, unspecified site     Benign essential hypertension     Gastroesophageal reflux disease without esophagitis     Panic attack     Morbid obesity (H)     Past Surgical History:   Procedure Laterality Date     NO HISTORY OF SURGERY         Social History   Substance Use Topics     Smoking status: Never Smoker     Smokeless tobacco: Never Used     Alcohol use 2.0 oz/week     4 Standard drinks or equivalent per week      Comment: Periods of heavy etoh use but not routinely.      Family  History   Problem Relation Age of Onset     Lipids Mother      C.A.D. Paternal Grandmother 65     cabg     Hypertension Paternal Grandmother      Lipids Brother      Cerebrovascular Disease No family hx of      Cancer - colorectal No family hx of      Prostate Cancer No family hx of          Current Outpatient Prescriptions   Medication Sig Dispense Refill     allopurinol (ZYLOPRIM) 100 MG tablet Take 1 tablet (100 mg) by mouth daily 90 tablet 3     losartan (COZAAR) 50 MG tablet Take 1 tablet (50 mg) by mouth daily 90 tablet 3     sildenafil (REVATIO) 20 MG tablet Take 2-5 tabs daily prn 30 tablet 11     simvastatin (ZOCOR) 40 MG tablet Take 1 tablet (40 mg) by mouth At Bedtime 90 tablet 3     [DISCONTINUED] simvastatin (ZOCOR) 40 MG tablet Take 1 tablet (40 mg) by mouth At Bedtime 90 tablet 3     No Known Allergies  Recent Labs   Lab Test  09/18/18   1500  08/31/18   1439   06/30/17   1043  05/02/16   0933  06/25/15   0931  01/15/15   0825   01/25/13   1042  11/07/12   1157   A1C   --    --    --    --    --    --   5.4   --    --   5.4   LDL   --    --    --   230*  189*   --   150*   --   96   --    HDL   --    --    --   52  55   --   43   --   38*   --    TRIG   --    --    --   174*  166*   --   335*   --   161*   --    ALT   --    --    --    --   29  30   --    --   38   --    CR  1.84*  1.69*   < >  1.65*  1.48*  1.56*  1.52*   < >  1.74*   --    GFRESTIMATED  40*  45*   < >  46*  52*  50*  51*   < >  44*   --    GFRESTBLACK  49*  54*   < >  56*  63  60*  62   < >  54*   --    POTASSIUM  4.3  4.0   < >  4.3  4.6  4.3  4.1   < >  4.6   --     < > = values in this interval not displayed.      BP Readings from Last 3 Encounters:   10/10/18 137/83   09/18/18 133/79   08/31/18 138/80    Wt Readings from Last 3 Encounters:   10/10/18 257 lb (116.6 kg)   09/18/18 253 lb 8 oz (115 kg)   08/31/18 257 lb 9.6 oz (116.8 kg)          ED issues.         Labs reviewed in EPIC    Reviewed and updated as needed this visit  by clinical staff  Tobacco  Allergies  Meds       Reviewed and updated as needed this visit by Provider       All other systems negative except as outline above  OBJECTIVE:  Eye exam - right eye normal lid, conjunctiva, cornea, pupil and fundus, left eye normal lid, conjunctiva, cornea, pupil and fundus.  Thyroid not palpable, not enlarged, no nodules detected.  CHEST:chest clear to IPPA, no tachypnea, retractions or cyanosis and S1, S2 normal, no murmur, no gallop, rate regular.    Diagnoses and all orders for this visit:    Erectile dysfunction, unspecified erectile dysfunction type  -     sildenafil (REVATIO) 20 MG tablet; Take 2-5 tabs daily prn    Benign essential hypertension  -     simvastatin (ZOCOR) 40 MG tablet; Take 1 tablet (40 mg) by mouth At Bedtime    CKD (chronic kidney disease) stage 3, GFR 30-59 ml/min (H)  -     simvastatin (ZOCOR) 40 MG tablet; Take 1 tablet (40 mg) by mouth At Bedtime    Hyperlipidemia LDL goal <130  -     simvastatin (ZOCOR) 40 MG tablet; Take 1 tablet (40 mg) by mouth At Bedtime    Other orders  -     Cancel: Lipid panel reflex to direct LDL Fasting  -     Cancel: Albumin Random Urine Quantitative with Creat Ratio  -     Cancel: Uric acid      Advised supportive and symptomatic treatment.  Follow up with Provider - if condition persists or worsens.   work on lifestyle modification  Recheck blood pressure in 6 mos

## 2019-06-26 ENCOUNTER — TELEPHONE (OUTPATIENT)
Dept: FAMILY MEDICINE | Facility: CLINIC | Age: 44
End: 2019-06-26

## 2019-06-26 DIAGNOSIS — M10.9 ACUTE GOUTY ARTHRITIS: ICD-10-CM

## 2019-06-26 RX ORDER — ALLOPURINOL 100 MG/1
100 TABLET ORAL DAILY
Qty: 30 TABLET | Refills: 0 | Status: SHIPPED | OUTPATIENT
Start: 2019-06-26 | End: 2019-06-26

## 2019-06-26 RX ORDER — ALLOPURINOL 100 MG/1
100 TABLET ORAL DAILY
Qty: 90 TABLET | Refills: 3 | Status: SHIPPED | OUTPATIENT
Start: 2019-06-26 | End: 2020-09-20

## 2019-10-10 ENCOUNTER — TELEPHONE (OUTPATIENT)
Dept: FAMILY MEDICINE | Facility: CLINIC | Age: 44
End: 2019-10-10

## 2019-10-10 ENCOUNTER — OFFICE VISIT (OUTPATIENT)
Dept: URGENT CARE | Facility: URGENT CARE | Age: 44
End: 2019-10-10
Payer: COMMERCIAL

## 2019-10-10 VITALS
WEIGHT: 247.6 LBS | OXYGEN SATURATION: 98 % | HEART RATE: 76 BPM | SYSTOLIC BLOOD PRESSURE: 154 MMHG | TEMPERATURE: 97.9 F | BODY MASS INDEX: 34.53 KG/M2 | DIASTOLIC BLOOD PRESSURE: 86 MMHG

## 2019-10-10 DIAGNOSIS — M10.9 ACUTE GOUTY ARTHRITIS: Primary | ICD-10-CM

## 2019-10-10 DIAGNOSIS — N18.30 CKD (CHRONIC KIDNEY DISEASE) STAGE 3, GFR 30-59 ML/MIN (H): ICD-10-CM

## 2019-10-10 DIAGNOSIS — I10 BENIGN ESSENTIAL HYPERTENSION: ICD-10-CM

## 2019-10-10 PROCEDURE — 99213 OFFICE O/P EST LOW 20 MIN: CPT | Performed by: PHYSICIAN ASSISTANT

## 2019-10-10 RX ORDER — PREDNISONE 20 MG/1
40 TABLET ORAL DAILY
Qty: 10 TABLET | Refills: 0 | Status: SHIPPED | OUTPATIENT
Start: 2019-10-10 | End: 2019-10-15

## 2019-10-10 ASSESSMENT — ENCOUNTER SYMPTOMS
DIAPHORESIS: 0
EYES NEGATIVE: 1
PALPITATIONS: 0
MYALGIAS: 0
FREQUENCY: 0
VOMITING: 0
HEMATURIA: 0
SHORTNESS OF BREATH: 0
NEUROLOGICAL NEGATIVE: 1
RHINORRHEA: 0
LIGHT-HEADEDNESS: 0
ARTHRALGIAS: 1
HEADACHES: 0
CARDIOVASCULAR NEGATIVE: 1
WHEEZING: 0
ADENOPATHY: 0
CONSTITUTIONAL NEGATIVE: 1
WEAKNESS: 0
POLYDIPSIA: 0
CHILLS: 0
CHEST TIGHTNESS: 0
FEVER: 0
NAUSEA: 0
DYSURIA: 0
ABDOMINAL PAIN: 0
EYE ITCHING: 0
ENDOCRINE NEGATIVE: 1
RESPIRATORY NEGATIVE: 1
EYE REDNESS: 0
GASTROINTESTINAL NEGATIVE: 1
EYE DISCHARGE: 0
COUGH: 0
DIARRHEA: 0
DIZZINESS: 0
SORE THROAT: 0

## 2019-10-10 NOTE — PROGRESS NOTES
Chief Complaint:    Chief Complaint   Patient presents with     Arthritis     Patient complains of gout in right ankle        HPI: Ilia Arnold is an 44 year old male who presents for evaluation and treatment of gout flare up.  Patient has a Hx of this and states that this feels the same.  Prednisone helps, and he comes in for refill of this today.  The pain is in the R ankle, and comes and goes. E denies any acute injury.        ROS:      Review of Systems   Constitutional: Negative.  Negative for chills, diaphoresis and fever.   HENT: Negative.  Negative for congestion, ear pain, rhinorrhea and sore throat.    Eyes: Negative.  Negative for discharge, redness and itching.   Respiratory: Negative.  Negative for cough, chest tightness, shortness of breath and wheezing.    Cardiovascular: Negative.  Negative for chest pain and palpitations.   Gastrointestinal: Negative.  Negative for abdominal pain, diarrhea, nausea and vomiting.   Endocrine: Negative.  Negative for polydipsia and polyuria.   Genitourinary: Negative for dysuria, frequency, hematuria and urgency.   Musculoskeletal: Positive for arthralgias. Negative for myalgias.   Skin: Negative for rash.   Allergic/Immunologic: Negative for immunocompromised state.   Neurological: Negative.  Negative for dizziness, weakness, light-headedness and headaches.   Hematological: Negative for adenopathy.        Family History   Family History   Problem Relation Age of Onset     Lipids Mother      C.A.D. Paternal Grandmother 65        cabg     Hypertension Paternal Grandmother      Lipids Brother      Cerebrovascular Disease No family hx of      Cancer - colorectal No family hx of      Prostate Cancer No family hx of        Social History  Social History     Socioeconomic History     Marital status: Single     Spouse name: Not on file     Number of children: 0     Years of education: Not on file     Highest education level: Not on file   Occupational History      Occupation: restaurant mgmt     Employer: [employer]   Social Needs     Financial resource strain: Not on file     Food insecurity:     Worry: Not on file     Inability: Not on file     Transportation needs:     Medical: Not on file     Non-medical: Not on file   Tobacco Use     Smoking status: Never Smoker     Smokeless tobacco: Never Used   Substance and Sexual Activity     Alcohol use: Yes     Alcohol/week: 3.3 standard drinks     Types: 4 Standard drinks or equivalent per week     Comment: Periods of heavy etoh use but not routinely.      Drug use: No     Sexual activity: Yes     Partners: Female   Lifestyle     Physical activity:     Days per week: Not on file     Minutes per session: Not on file     Stress: Not on file   Relationships     Social connections:     Talks on phone: Not on file     Gets together: Not on file     Attends Cheondoism service: Not on file     Active member of club or organization: Not on file     Attends meetings of clubs or organizations: Not on file     Relationship status: Not on file     Intimate partner violence:     Fear of current or ex partner: Not on file     Emotionally abused: Not on file     Physically abused: Not on file     Forced sexual activity: Not on file   Other Topics Concern     Parent/sibling w/ CABG, MI or angioplasty before 65F 55M? No   Social History Narrative     Not on file        Surgical History:  Past Surgical History:   Procedure Laterality Date     NO HISTORY OF SURGERY          Problem List:  Patient Active Problem List   Diagnosis     Acute gouty arthritis     Erectile dysfunction     Hyperlipidemia LDL goal <130     Renal hypertension     CKD (chronic kidney disease) stage 3, GFR 30-59 ml/min (H)     Proteinuria     Idiopathic chronic gout without tophus, unspecified site     Benign essential hypertension     Gastroesophageal reflux disease without esophagitis     Panic attack     Morbid obesity (H)        Allergies:  No Known Allergies      Current Meds:    Current Outpatient Medications:      allopurinol (ZYLOPRIM) 100 MG tablet, Take 1 tablet (100 mg) by mouth daily, Disp: 90 tablet, Rfl: 3     losartan (COZAAR) 50 MG tablet, Take 1 tablet (50 mg) by mouth daily, Disp: 90 tablet, Rfl: 3     predniSONE (DELTASONE) 20 MG tablet, Take 2 tablets (40 mg) by mouth daily for 5 days, Disp: 10 tablet, Rfl: 0     sildenafil (REVATIO) 20 MG tablet, Take 2-5 tabs daily prn, Disp: 30 tablet, Rfl: 11     simvastatin (ZOCOR) 40 MG tablet, Take 1 tablet (40 mg) by mouth At Bedtime, Disp: 90 tablet, Rfl: 3     PHYSICAL EXAM:     Vital signs noted and reviewed by Errol Hutchinson PA-C  BP (!) 154/86 (BP Location: Left arm, Patient Position: Chair, Cuff Size: Adult Regular)   Pulse 76   Temp 97.9  F (36.6  C) (Oral)   Wt 112.3 kg (247 lb 9.6 oz)   SpO2 98%   BMI 34.53 kg/m       PEFR:    Physical Exam  Vitals signs and nursing note reviewed.   Constitutional:       General: He is not in acute distress.     Appearance: He is well-developed. He is not ill-appearing, toxic-appearing or diaphoretic.   HENT:      Head: Normocephalic and atraumatic.      Right Ear: Hearing, tympanic membrane, ear canal and external ear normal. Tympanic membrane is not perforated, erythematous, retracted or bulging.      Left Ear: Hearing, tympanic membrane, ear canal and external ear normal. Tympanic membrane is not perforated, erythematous, retracted or bulging.      Nose: Nose normal. No mucosal edema or rhinorrhea.      Mouth/Throat:      Pharynx: No oropharyngeal exudate or posterior oropharyngeal erythema.      Tonsils: No tonsillar exudate or tonsillar abscesses. Swellin on the right. 0 on the left.   Eyes:      Pupils: Pupils are equal, round, and reactive to light.   Neck:      Musculoskeletal: Normal range of motion and neck supple.   Cardiovascular:      Rate and Rhythm: Normal rate and regular rhythm.      Heart sounds: Normal heart sounds, S1 normal and S2 normal.  Heart sounds not distant. No murmur. No friction rub. No gallop.    Pulmonary:      Effort: Pulmonary effort is normal. No respiratory distress.      Breath sounds: Normal breath sounds. No decreased breath sounds, wheezing, rhonchi or rales.   Abdominal:      General: Bowel sounds are normal. There is no distension.      Palpations: Abdomen is soft.      Tenderness: There is no tenderness.   Musculoskeletal:      Right ankle: He exhibits swelling. He exhibits normal range of motion and normal pulse. No tenderness.   Lymphadenopathy:      Cervical: No cervical adenopathy.   Skin:     General: Skin is warm and dry.      Findings: No rash.   Neurological:      Mental Status: He is alert.      Cranial Nerves: No cranial nerve deficit.   Psychiatric:         Attention and Perception: He is attentive.         Speech: Speech normal.         Behavior: Behavior normal. Behavior is cooperative.         Thought Content: Thought content normal.         Judgement: Judgment normal.          Labs:       Medical Decision Making:    Differential Diagnosis:  Gout, hypertension     ASSESSMENT:     1. Acute gouty arthritis    2. CKD (chronic kidney disease) stage 3, GFR 30-59 ml/min (H)    3. Benign essential hypertension           PLAN:     Patient is hypertensive oin clinic today.  He was encouraged to follow up with his PCP in the next week for re-evaluation of this.  Staff came to room and assisted with making an appointment for him.  Rx for Prednisone today.  Worrisome symptoms discussed with instructions to go to the ED.  Patient verbalized understanding and agreed with this plan.     Errol Hutchinson PA-C  10/10/2019, 11:56 AM

## 2019-10-10 NOTE — TELEPHONE ENCOUNTER
Reason for call:  Medication   If this is a refill request, has the caller requested the refill from the pharmacy already? No  Will the patient be using a [facility]? Yes  Name of the pharmacy and phone number for the current request: [facility]    Name of the medication requested: prednisone    Other request: it is for a gout flare up, patient would like a prescription called in today if possible    Phone number to reach patient:  Cell number on file:    Telephone Information:   Mobile [phone]       Best Time:  any    Can we leave a detailed message on this number?  YES     Lillie GARCIA  Central Scheduler

## 2019-10-14 ENCOUNTER — TELEPHONE (OUTPATIENT)
Dept: FAMILY MEDICINE | Facility: CLINIC | Age: 44
End: 2019-10-14

## 2019-10-14 DIAGNOSIS — I10 BENIGN ESSENTIAL HYPERTENSION: ICD-10-CM

## 2019-10-14 RX ORDER — LOSARTAN POTASSIUM 50 MG/1
50 TABLET ORAL DAILY
Qty: 30 TABLET | Refills: 0 | Status: SHIPPED | OUTPATIENT
Start: 2019-10-14 | End: 2019-11-22

## 2019-10-14 NOTE — TELEPHONE ENCOUNTER
Patient is wondering if he could get a refill on his blood pressure medication to get him by until his upcoming appointment. Please advise.    Thank you

## 2019-10-14 NOTE — TELEPHONE ENCOUNTER
Routing refill request to provider for review/approval because:    BP Readings from Last 3 Encounters:   10/10/19 (!) 154/86   10/10/18 137/83   09/18/18 133/79     Sodium   Date Value Ref Range Status   09/18/2018 142 133 - 144 mmol/L Final     Potassium   Date Value Ref Range Status   09/18/2018 4.3 3.4 - 5.3 mmol/L Final       Failed protocol.     Follow up appointment scheduled on 10/22/19.    Donna Martins RN BSN

## 2019-10-17 ENCOUNTER — OFFICE VISIT (OUTPATIENT)
Dept: FAMILY MEDICINE | Facility: CLINIC | Age: 44
End: 2019-10-17
Payer: COMMERCIAL

## 2019-10-17 VITALS
TEMPERATURE: 98.7 F | DIASTOLIC BLOOD PRESSURE: 82 MMHG | WEIGHT: 257 LBS | OXYGEN SATURATION: 97 % | HEIGHT: 71 IN | BODY MASS INDEX: 35.98 KG/M2 | HEART RATE: 76 BPM | SYSTOLIC BLOOD PRESSURE: 136 MMHG

## 2019-10-17 DIAGNOSIS — N18.30 CKD (CHRONIC KIDNEY DISEASE) STAGE 3, GFR 30-59 ML/MIN (H): ICD-10-CM

## 2019-10-17 DIAGNOSIS — I10 BENIGN ESSENTIAL HYPERTENSION: ICD-10-CM

## 2019-10-17 DIAGNOSIS — R51.9 PRESSURE IN HEAD: ICD-10-CM

## 2019-10-17 DIAGNOSIS — M25.522 PAIN IN JOINT INVOLVING UPPER ARM, LEFT: Primary | ICD-10-CM

## 2019-10-17 PROCEDURE — 99214 OFFICE O/P EST MOD 30 MIN: CPT | Performed by: FAMILY MEDICINE

## 2019-10-17 PROCEDURE — 93000 ELECTROCARDIOGRAM COMPLETE: CPT | Performed by: FAMILY MEDICINE

## 2019-10-17 ASSESSMENT — PATIENT HEALTH QUESTIONNAIRE - PHQ9
5. POOR APPETITE OR OVEREATING: SEVERAL DAYS
SUM OF ALL RESPONSES TO PHQ QUESTIONS 1-9: 3

## 2019-10-17 ASSESSMENT — ANXIETY QUESTIONNAIRES
2. NOT BEING ABLE TO STOP OR CONTROL WORRYING: SEVERAL DAYS
7. FEELING AFRAID AS IF SOMETHING AWFUL MIGHT HAPPEN: NOT AT ALL
GAD7 TOTAL SCORE: 4
3. WORRYING TOO MUCH ABOUT DIFFERENT THINGS: NOT AT ALL
5. BEING SO RESTLESS THAT IT IS HARD TO SIT STILL: SEVERAL DAYS
1. FEELING NERVOUS, ANXIOUS, OR ON EDGE: SEVERAL DAYS
6. BECOMING EASILY ANNOYED OR IRRITABLE: NOT AT ALL
IF YOU CHECKED OFF ANY PROBLEMS ON THIS QUESTIONNAIRE, HOW DIFFICULT HAVE THESE PROBLEMS MADE IT FOR YOU TO DO YOUR WORK, TAKE CARE OF THINGS AT HOME, OR GET ALONG WITH OTHER PEOPLE: NOT DIFFICULT AT ALL

## 2019-10-17 ASSESSMENT — MIFFLIN-ST. JEOR: SCORE: 2077.87

## 2019-10-17 NOTE — PATIENT INSTRUCTIONS
Please call your clinic of choice to schedule this procedure:    [facility]:   [phone]  [facility]:   [phone]  [facility]:  [phone]  [facility]): [phone]  [facility]:   [phone]  [facility]:  [phone]  [facility]): [phone]  [facility] [phone]

## 2019-10-17 NOTE — PROGRESS NOTES
Chief complaint: bp elevation    Last week had gout went into urgent care  At that time BP was noticed BP was high    Since then patient has been feeling some anxious  Feels some pressure on the sides of eyes feels sore  No thoughts of harming self or others  Feels safe     Today seems to be better - just a little bit    Yesterday was driving in traffic and had pressure in the face  After traffic was better    A couple of nights would have episodes of pulse in his head     Feels like some of the headache could be anxiety related    Patient just wants to make sure it's not a stroke or  A heart blockage    No chest pain or shortness of breath    Has an occasional random twinge for a second      In the past about a year ago has had left axillary pain radiating to the left arm   Had an EKG it was normal    At that time was told likely a pinch nerve  Mostly reproducible by laying on his left side and relieved with moving  However occasionally he would have it when he feels some emotional stress he would notice it     Last one was more than 2-3 weeks ago     PERC Rule     Age <50 years   Heart rate <100 beats/minute   Oxyhemoglobin saturation ?95 percent   No hemoptysis   No estrogen use   No prior DVT or PE   No unilateral leg swelling   No surgery/trauma requiring hospitalization within the prior four weeks    EMILY 4      No Known Allergies    Past Medical History:   Diagnosis Date     Gout      Hyperlipidemia LDL goal < 160      Hypertension        allopurinol (ZYLOPRIM) 100 MG tablet, Take 1 tablet (100 mg) by mouth daily  losartan (COZAAR) 50 MG tablet, Take 1 tablet (50 mg) by mouth daily  sildenafil (REVATIO) 20 MG tablet, Take 2-5 tabs daily prn  simvastatin (ZOCOR) 40 MG tablet, Take 1 tablet (40 mg) by mouth At Bedtime  [] predniSONE (DELTASONE) 20 MG tablet, Take 2 tablets (40 mg) by mouth daily for 5 days    No current facility-administered medications on file prior to visit.       Social History  "    Tobacco Use     Smoking status: Never Smoker     Smokeless tobacco: Never Used   Substance Use Topics     Alcohol use: Yes     Alcohol/week: 3.3 standard drinks     Types: 4 Standard drinks or equivalent per week     Comment: Periods of heavy etoh use but not routinely.      Drug use: No       ROS:   review of systems negative except for noted above.   No thoughts of harming self or others.     OBJECTIVE:  /82   Pulse 76   Temp 98.7  F (37.1  C) (Oral)   Ht 1.803 m (5' 11\")   Wt 116.6 kg (257 lb)   SpO2 97%   BMI 35.84 kg/m     General:   awake, alert, and cooperative.  NAD.   Head: Normocephalic, atraumatic.  Eyes: Conjunctiva clear,   Heart: Regular rate and rhythm. No murmur.  Lungs: Chest is clear; no wheezes or rales.   Abdomen: soft non-tender.  Neuro: Alert and oriented - normal speech.  MS: Using extremities freely  PSYCH:  Normal affect, normal speech  SKIN: no obvious rashes    ASSESSMENT:    ICD-10-CM    1. Pain in joint involving upper arm, left M25.522 EKG 12-lead complete w/read - Clinics     Echo Stress Echocardiogram   2. Benign essential hypertension I10 Echo Stress Echocardiogram   3. CKD (chronic kidney disease) stage 3, GFR 30-59 ml/min (H) N18.3 Echo Stress Echocardiogram   4. Pressure in head R51        PLAN:   EKG to my review normal sinus rhythm no change compared to previous  Arm pain/chest pain positional with tingling likely peripheral nerve getting pinched - very occasionally bothering him - if more frequent further evaluation recommended  However he also mentions similar symptoms when he has emotional stress  Recommend stress test  PERC score low - very low clinical preprobability of PE and symptoms do not correlate  If with any symptoms of chest pain or shortness of breath, lightheadedness, palpitations, feeling like passing out or change and worsening in the quality of your symptoms, please proceed to ER. Recommend follow up with PCP in a few days for re-evaluation. "     Pressure in head - patient states could be stress/anxiety - started after he noticed he had high blood pressure  He is very anxious in clinic  Recommend BP monitoring and see if head pressure is accompanied by elevated BP and follow up with primary care provider - he already has appointment  If persistent concerns recommend MRI - he declines at this time  Head pressure is mild  Alarm signs or symptoms discussed, if present recommend go to ER     Follow up with primary care provider recommended for concerns addressed today and for anxiety    Aware that if with any worsening depression, thoughts of harming self or others, call 911 or go to ER  5      Advised about symptoms which might herald more serious problems.        Daylin Aguilar MD

## 2019-10-18 ASSESSMENT — ANXIETY QUESTIONNAIRES: GAD7 TOTAL SCORE: 4

## 2019-11-22 DIAGNOSIS — I10 BENIGN ESSENTIAL HYPERTENSION: ICD-10-CM

## 2019-11-22 NOTE — TELEPHONE ENCOUNTER
"Requested Prescriptions   Pending Prescriptions Disp Refills     losartan (COZAAR) 50 MG tablet [Pharmacy Med Name: LOSARTAN POTASSIUM 50MG TABS]  Last Written Prescription Date:  10/14/19  Last Fill Quantity: 30,  # refills: 0   Last office visit: 10/10/18 with prescribing provider:  JESSICA Ty   Future Office Visit:     30 tablet 0     Sig: TAKE ONE TABLET BY MOUTH ONCE DAILY (NEEDS TO BE SEEN FOR MORE REFILLS)       Angiotensin-II Receptors Failed - 11/22/2019  9:27 AM        Failed - Recent (12 mo) or future (30 days) visit within the authorizing provider's specialty     Patient has had an office visit with the authorizing provider or a provider within the authorizing providers department within the previous 12 mos or has a future within next 30 days. See \"Patient Info\" tab in inbasket, or \"Choose Columns\" in Meds & Orders section of the refill encounter.              Failed - Normal serum creatinine on file in past 12 months     Recent Labs   Lab Test 09/18/18  1500   CR 1.84*             Failed - Normal serum potassium on file in past 12 months     Recent Labs   Lab Test 09/18/18  1500   POTASSIUM 4.3                    Passed - Last blood pressure under 140/90 in past 12 months     BP Readings from Last 3 Encounters:   10/17/19 136/82   10/10/19 (!) 154/86   10/10/18 137/83                 Passed - Medication is active on med list        Passed - Patient is age 18 or older          "

## 2019-11-22 NOTE — LETTER
November 25, 2019        Ilia Arnold  [address] [facility] [facility] [address]      Dear Ilia,    Your medication has been approved for losartan (COZAAR) 50 MG tablet for one month only.    However, you are due for a physical appointment for further refills. Please schedule this visit at your earliest convenience.    Thank you.      Lazaro Ty PA-C/yuliya

## 2019-11-22 NOTE — TELEPHONE ENCOUNTER
Routing refill request to provider for review/approval because:  Ally given x1 and patient did not follow up, please advise  Labs not current:  All  Patient needs to be seen because it has been more than 1 year since last office visit.

## 2019-11-23 RX ORDER — LOSARTAN POTASSIUM 50 MG/1
TABLET ORAL
Qty: 30 TABLET | Refills: 0 | Status: SHIPPED | OUTPATIENT
Start: 2019-11-23 | End: 2020-01-03

## 2020-01-03 ENCOUNTER — OFFICE VISIT (OUTPATIENT)
Dept: FAMILY MEDICINE | Facility: CLINIC | Age: 45
End: 2020-01-03
Payer: COMMERCIAL

## 2020-01-03 VITALS
RESPIRATION RATE: 20 BRPM | SYSTOLIC BLOOD PRESSURE: 144 MMHG | HEART RATE: 85 BPM | WEIGHT: 262.4 LBS | BODY MASS INDEX: 36.73 KG/M2 | TEMPERATURE: 98.1 F | OXYGEN SATURATION: 98 % | DIASTOLIC BLOOD PRESSURE: 88 MMHG | HEIGHT: 71 IN

## 2020-01-03 DIAGNOSIS — N18.30 CKD (CHRONIC KIDNEY DISEASE) STAGE 3, GFR 30-59 ML/MIN (H): ICD-10-CM

## 2020-01-03 DIAGNOSIS — F41.1 GAD (GENERALIZED ANXIETY DISORDER): Primary | ICD-10-CM

## 2020-01-03 DIAGNOSIS — N52.9 ERECTILE DYSFUNCTION, UNSPECIFIED ERECTILE DYSFUNCTION TYPE: ICD-10-CM

## 2020-01-03 DIAGNOSIS — I10 BENIGN ESSENTIAL HYPERTENSION: ICD-10-CM

## 2020-01-03 DIAGNOSIS — E78.5 HYPERLIPIDEMIA LDL GOAL <130: ICD-10-CM

## 2020-01-03 LAB
ALBUMIN SERPL-MCNC: 3.6 G/DL (ref 3.4–5)
ALP SERPL-CCNC: 75 U/L (ref 40–150)
ALT SERPL W P-5'-P-CCNC: 24 U/L (ref 0–70)
ANION GAP SERPL CALCULATED.3IONS-SCNC: 2 MMOL/L (ref 3–14)
AST SERPL W P-5'-P-CCNC: 22 U/L (ref 0–45)
BILIRUB SERPL-MCNC: 0.2 MG/DL (ref 0.2–1.3)
BUN SERPL-MCNC: 24 MG/DL (ref 7–30)
CALCIUM SERPL-MCNC: 9.6 MG/DL (ref 8.5–10.1)
CHLORIDE SERPL-SCNC: 107 MMOL/L (ref 94–109)
CO2 SERPL-SCNC: 31 MMOL/L (ref 20–32)
CREAT SERPL-MCNC: 1.74 MG/DL (ref 0.66–1.25)
DIFFERENTIAL METHOD BLD: NORMAL
EOSINOPHIL # BLD AUTO: 0.1 10E9/L (ref 0–0.7)
EOSINOPHIL NFR BLD AUTO: 1 %
ERYTHROCYTE [DISTWIDTH] IN BLOOD BY AUTOMATED COUNT: 12.8 % (ref 10–15)
GFR SERPL CREATININE-BSD FRML MDRD: 47 ML/MIN/{1.73_M2}
GLUCOSE SERPL-MCNC: 76 MG/DL (ref 70–99)
HCT VFR BLD AUTO: 49.8 % (ref 40–53)
HGB BLD-MCNC: 17.1 G/DL (ref 13.3–17.7)
LYMPHOCYTES # BLD AUTO: 1.8 10E9/L (ref 0.8–5.3)
LYMPHOCYTES NFR BLD AUTO: 23 %
MCH RBC QN AUTO: 30.1 PG (ref 26.5–33)
MCHC RBC AUTO-ENTMCNC: 34.3 G/DL (ref 31.5–36.5)
MCV RBC AUTO: 88 FL (ref 78–100)
MONOCYTES # BLD AUTO: 0.5 10E9/L (ref 0–1.3)
MONOCYTES NFR BLD AUTO: 7 %
NEUTROPHILS # BLD AUTO: 5.3 10E9/L (ref 1.6–8.3)
NEUTROPHILS NFR BLD AUTO: 69 %
PLATELET # BLD AUTO: 236 10E9/L (ref 150–450)
PLATELET # BLD EST: NORMAL 10*3/UL
POTASSIUM SERPL-SCNC: 4.6 MMOL/L (ref 3.4–5.3)
PROT SERPL-MCNC: 7.2 G/DL (ref 6.8–8.8)
RBC # BLD AUTO: 5.69 10E12/L (ref 4.4–5.9)
RBC MORPH BLD: NORMAL
SODIUM SERPL-SCNC: 140 MMOL/L (ref 133–144)
TSH SERPL DL<=0.005 MIU/L-ACNC: 1.72 MU/L (ref 0.4–4)
WBC # BLD AUTO: 7.7 10E9/L (ref 4–11)

## 2020-01-03 PROCEDURE — 80050 GENERAL HEALTH PANEL: CPT | Performed by: PHYSICIAN ASSISTANT

## 2020-01-03 PROCEDURE — 36415 COLL VENOUS BLD VENIPUNCTURE: CPT | Performed by: PHYSICIAN ASSISTANT

## 2020-01-03 PROCEDURE — 99214 OFFICE O/P EST MOD 30 MIN: CPT | Performed by: PHYSICIAN ASSISTANT

## 2020-01-03 RX ORDER — ESCITALOPRAM OXALATE 10 MG/1
TABLET ORAL
Qty: 60 TABLET | Refills: 0 | Status: SHIPPED | OUTPATIENT
Start: 2020-01-03 | End: 2022-09-01

## 2020-01-03 RX ORDER — ALPRAZOLAM 0.5 MG
0.5 TABLET ORAL 3 TIMES DAILY PRN
Qty: 20 TABLET | Refills: 0 | Status: SHIPPED | OUTPATIENT
Start: 2020-01-03 | End: 2022-09-01

## 2020-01-03 RX ORDER — SIMVASTATIN 40 MG
40 TABLET ORAL AT BEDTIME
Qty: 90 TABLET | Refills: 3 | Status: SHIPPED | OUTPATIENT
Start: 2020-01-03 | End: 2021-02-10

## 2020-01-03 RX ORDER — LOSARTAN POTASSIUM 100 MG/1
100 TABLET ORAL DAILY
Qty: 90 TABLET | Refills: 3 | Status: SHIPPED | OUTPATIENT
Start: 2020-01-03 | End: 2021-02-10

## 2020-01-03 RX ORDER — SILDENAFIL CITRATE 20 MG/1
TABLET ORAL
Qty: 30 TABLET | Refills: 11 | Status: SHIPPED | OUTPATIENT
Start: 2020-01-03 | End: 2021-07-29

## 2020-01-03 ASSESSMENT — MIFFLIN-ST. JEOR: SCORE: 2102.37

## 2020-01-03 NOTE — PROGRESS NOTES
"Subjective     Ilia Arnold is a 44 year old male who presents to clinic today for the following health issues:    HPI   Hypertension Follow-up      Do you check your blood pressure regularly outside of the clinic? No     Are you following a low salt diet? No    Are your blood pressures ever more than 140 on the top number (systolic) OR more   than 90 on the bottom number (diastolic), for example 140/90? Yes      How many servings of fruits and vegetables do you eat daily?  0-1    On average, how many sweetened beverages do you drink each day (Examples: soda, juice, sweet tea, etc.  Do NOT count diet or artificially sweetened beverages)?   2  How many days per week do you miss taking your medication? Usually very good, however ran out of BP mes    What makes it hard for you to take your medications?  .    Concern - \"weird\" feeling in legs, \"pumping\" in leg, dizziness  Onset: 2 wks    Description:   Vague: stressed .some episodes of dizziness. Feels better when moving around . No chest pain/sob/palps. No headaches.     Intensity: mild    Progression of Symptoms:  same and intermittent - always in the morning    Previous history of similar problem:     None    Precipitating factors:     Worsened by: none    Alleviating factors:  Improved by: cold        Patient Active Problem List   Diagnosis     Acute gouty arthritis     Erectile dysfunction     Hyperlipidemia LDL goal <130     Renal hypertension     CKD (chronic kidney disease) stage 3, GFR 30-59 ml/min (H)     Proteinuria     Idiopathic chronic gout without tophus, unspecified site     Benign essential hypertension     Gastroesophageal reflux disease without esophagitis     Panic attack     Morbid obesity (H)     Past Surgical History:   Procedure Laterality Date     NO HISTORY OF SURGERY         Social History     Tobacco Use     Smoking status: Never Smoker     Smokeless tobacco: Never Used   Substance Use Topics     Alcohol use: Yes     Alcohol/week: 3.3 " standard drinks     Types: 4 Standard drinks or equivalent per week     Comment: once a week     Family History   Problem Relation Age of Onset     Lipids Mother      C.A.D. Paternal Grandmother 65        cabg     Hypertension Paternal Grandmother      Lipids Brother      Cerebrovascular Disease No family hx of      Cancer - colorectal No family hx of      Prostate Cancer No family hx of          Current Outpatient Medications   Medication Sig Dispense Refill     allopurinol (ZYLOPRIM) 100 MG tablet Take 1 tablet (100 mg) by mouth daily 90 tablet 3     ALPRAZolam (XANAX) 0.5 MG tablet Take 1 tablet (0.5 mg) by mouth 3 times daily as needed for anxiety 20 tablet 0     escitalopram (LEXAPRO) 10 MG tablet Take 1/2 tab daily for 7 days, then increase to 1 tab daily thereafter. 60 tablet 0     losartan (COZAAR) 100 MG tablet Take 1 tablet (100 mg) by mouth daily 90 tablet 3     sildenafil (REVATIO) 20 MG tablet Take 2-5 tabs daily prn 30 tablet 11     simvastatin (ZOCOR) 40 MG tablet Take 1 tablet (40 mg) by mouth At Bedtime 90 tablet 3     No Known Allergies  Recent Labs   Lab Test 09/18/18  1500 08/31/18  1439  06/30/17  1043 05/02/16  0933 06/25/15  0931 01/15/15  0825  01/25/13  1042 11/07/12  1157   A1C  --   --   --   --   --   --  5.4  --   --  5.4   LDL  --   --   --  230* 189*  --  150*  --  96  --    HDL  --   --   --  52 55  --  43  --  38*  --    TRIG  --   --   --  174* 166*  --  335*  --  161*  --    ALT  --   --   --   --  29 30  --   --  38  --    CR 1.84* 1.69*   < > 1.65* 1.48* 1.56* 1.52*   < > 1.74*  --    GFRESTIMATED 40* 45*   < > 46* 52* 50* 51*   < > 44*  --    GFRESTBLACK 49* 54*   < > 56* 63 60* 62   < > 54*  --    POTASSIUM 4.3 4.0   < > 4.3 4.6 4.3 4.1   < > 4.6  --     < > = values in this interval not displayed.      BP Readings from Last 3 Encounters:   01/03/20 (!) 144/88   10/17/19 136/82   10/10/19 (!) 154/86    Wt Readings from Last 3 Encounters:   01/03/20 119 kg (262 lb 6.4 oz)  "  10/17/19 116.6 kg (257 lb)   10/10/19 112.3 kg (247 lb 9.6 oz)                      Reviewed and updated as needed this visit by Provider         Review of Systems   ROS COMP: Constitutional, HEENT, cardiovascular, pulmonary, GI, , musculoskeletal, neuro, skin, endocrine and psych systems are negative, except as otherwise noted.      Objective    BP (!) 144/88   Pulse 85   Temp 98.1  F (36.7  C) (Tympanic)   Resp 20   Ht 1.803 m (5' 11\")   Wt 119 kg (262 lb 6.4 oz)   SpO2 98%   BMI 36.60 kg/m    Body mass index is 36.6 kg/m .  Physical Exam   Eye exam - right eye normal lid, conjunctiva, cornea, pupil and fundus, left eye normal lid, conjunctiva, cornea, pupil and fundus.  Thyroid not palpable, not enlarged, no nodules detected.  CHEST:chest clear to IPPA, no tachypnea, retractions or cyanosis and S1, S2 normal, no murmur, no gallop, rate regular.  No edema    Ilia was seen today for recheck medication.    Diagnoses and all orders for this visit:    EMILY (generalized anxiety disorder)  -     TSH with free T4 reflex  -     Comprehensive metabolic panel  -     CBC with platelets differential  -     ALPRAZolam (XANAX) 0.5 MG tablet; Take 1 tablet (0.5 mg) by mouth 3 times daily as needed for anxiety  -     escitalopram (LEXAPRO) 10 MG tablet; Take 1/2 tab daily for 7 days, then increase to 1 tab daily thereafter.    Benign essential hypertension  -     Comprehensive metabolic panel  -     losartan (COZAAR) 100 MG tablet; Take 1 tablet (100 mg) by mouth daily  -     simvastatin (ZOCOR) 40 MG tablet; Take 1 tablet (40 mg) by mouth At Bedtime    CKD (chronic kidney disease) stage 3, GFR 30-59 ml/min (H)  -     simvastatin (ZOCOR) 40 MG tablet; Take 1 tablet (40 mg) by mouth At Bedtime    Hyperlipidemia LDL goal <130  -     simvastatin (ZOCOR) 40 MG tablet; Take 1 tablet (40 mg) by mouth At Bedtime    Erectile dysfunction, unspecified erectile dysfunction type  -     sildenafil (REVATIO) 20 MG tablet; Take 2-5 " tabs daily prn      work on lifestyle modification  Recheck in 4-5 wks

## 2020-01-03 NOTE — LETTER
January 13, 2020      Ilia Arnold  [address] [facility] [address]        Dear ,    We are writing to inform you of your test results.    Overall your lab work came back nice and stable/normal. More specifically, your creatinine remains stable.     Resulted Orders   TSH with free T4 reflex   Result Value Ref Range    TSH 1.72 0.40 - 4.00 mU/L   Comprehensive metabolic panel   Result Value Ref Range    Sodium 140 133 - 144 mmol/L    Potassium 4.6 3.4 - 5.3 mmol/L    Chloride 107 94 - 109 mmol/L    Carbon Dioxide 31 20 - 32 mmol/L    Anion Gap 2 (L) 3 - 14 mmol/L    Glucose 76 70 - 99 mg/dL    Urea Nitrogen 24 7 - 30 mg/dL    Creatinine 1.74 (H) 0.66 - 1.25 mg/dL    GFR Estimate 47 (L) >60 mL/min/[1.73_m2]      Comment:      Non  GFR Calc  Starting 12/18/2018, serum creatinine based estimated GFR (eGFR) will be   calculated using the Chronic Kidney Disease Epidemiology Collaboration   (CKD-EPI) equation.      GFR Estimate If Black 54 (L) >60 mL/min/[1.73_m2]      Comment:       GFR Calc  Starting 12/18/2018, serum creatinine based estimated GFR (eGFR) will be   calculated using the Chronic Kidney Disease Epidemiology Collaboration   (CKD-EPI) equation.      Calcium 9.6 8.5 - 10.1 mg/dL    Bilirubin Total 0.2 0.2 - 1.3 mg/dL    Albumin 3.6 3.4 - 5.0 g/dL    Protein Total 7.2 6.8 - 8.8 g/dL    Alkaline Phosphatase 75 40 - 150 U/L    ALT 24 0 - 70 U/L    AST 22 0 - 45 U/L   CBC with platelets differential   Result Value Ref Range    WBC 7.7 4.0 - 11.0 10e9/L    RBC Count 5.69 4.4 - 5.9 10e12/L    Hemoglobin 17.1 13.3 - 17.7 g/dL    Hematocrit 49.8 40.0 - 53.0 %    MCV 88 78 - 100 fl    MCH 30.1 26.5 - 33.0 pg    MCHC 34.3 31.5 - 36.5 g/dL    RDW 12.8 10.0 - 15.0 %    Platelet Count 236 150 - 450 10e9/L    % Neutrophils 69.0 %    % Lymphocytes 23.0 %    % Monocytes 7.0 %    % Eosinophils 1.0 %    Absolute Neutrophil 5.3 1.6 - 8.3 10e9/L    Absolute Lymphocytes 1.8 0.8  - 5.3 10e9/L    Absolute Monocytes 0.5 0.0 - 1.3 10e9/L    Absolute Eosinophils 0.1 0.0 - 0.7 10e9/L    RBC Morphology Normal     Platelet Estimate       Automated count confirmed.  Platelet morphology is normal.    Diff Method Automated Method        If you have any questions or concerns, please call the clinic at the number listed above.       Sincerely,        Lazaro Ty PA-C/ravindero

## 2020-09-09 ENCOUNTER — NURSE TRIAGE (OUTPATIENT)
Dept: NURSING | Facility: CLINIC | Age: 45
End: 2020-09-09

## 2020-09-09 NOTE — TELEPHONE ENCOUNTER
He has pain on his right side between his hip and pelvis. He thinks it's a hernia although there is no bulging. I connected him with scheduling for an appointment today and advised urgent care if they can't get him in today.  Sulma Barahona RN  [facility]      Additional Information    Negative: Passed out (i.e., fainted, collapsed and was not responding)    Negative: Shock suspected (e.g., cold/pale/clammy skin, too weak to stand, low BP, rapid pulse)    Negative: Sounds like a life-threatening emergency to the triager    Negative: Chest pain    Negative: Pain is mainly in upper abdomen (if needed ask: 'is it mainly above the belly button?')    Negative: SEVERE abdominal pain (e.g., excruciating)     Pain at 4    Negative: Vomiting red blood or black (coffee ground) material    Negative: Bloody, black, or tarry bowel movements    Negative: Unable to urinate (or only a few drops) and bladder feels very full    Negative: Pain in scrotum persists > 1 hour    Negative: Constant abdominal pain lasting > 2 hours    Negative: Vomiting bile (green color)    Negative: Patient sounds very sick or weak to the triager    Negative: Vomiting and abdomen looks much more swollen than usual    Negative: White of the eyes have turned yellow (i.e., jaundice)    Negative: Blood in urine (red, pink, or tea-colored)    Negative: Fever > 103 F (39.4 C)    Negative: Fever > 101 F (38.3 C) and over 60 years of age    Negative: Fever > 100.0 F (37.8 C) and has diabetes mellitus or a weak immune system (e.g., HIV positive, cancer chemotherapy, organ transplant, splenectomy, chronic steroids)    Negative: Fever > 100.0 F (37.8 C) and bedridden (e.g., nursing home patient, stroke, chronic illness, recovering from surgery)    Mild pain that comes and goes (cramps) lasts > 24 hours    Protocols used: ABDOMINAL PAIN - MALE-A-OH

## 2020-09-10 ENCOUNTER — OFFICE VISIT (OUTPATIENT)
Dept: FAMILY MEDICINE | Facility: CLINIC | Age: 45
End: 2020-09-10
Payer: COMMERCIAL

## 2020-09-10 VITALS
HEART RATE: 57 BPM | HEIGHT: 71 IN | DIASTOLIC BLOOD PRESSURE: 71 MMHG | OXYGEN SATURATION: 97 % | BODY MASS INDEX: 32.5 KG/M2 | RESPIRATION RATE: 14 BRPM | TEMPERATURE: 98 F | WEIGHT: 232.13 LBS | SYSTOLIC BLOOD PRESSURE: 122 MMHG

## 2020-09-10 DIAGNOSIS — I12.9 RENAL HYPERTENSION: ICD-10-CM

## 2020-09-10 DIAGNOSIS — R10.31 RIGHT INGUINAL PAIN: Primary | ICD-10-CM

## 2020-09-10 LAB
ALBUMIN SERPL-MCNC: 3.8 G/DL (ref 3.4–5)
ALBUMIN UR-MCNC: 100 MG/DL
ANION GAP SERPL CALCULATED.3IONS-SCNC: 8 MMOL/L (ref 3–14)
APPEARANCE UR: CLEAR
BILIRUB UR QL STRIP: NEGATIVE
BUN SERPL-MCNC: 23 MG/DL (ref 7–30)
CALCIUM SERPL-MCNC: 9.1 MG/DL (ref 8.5–10.1)
CHLORIDE SERPL-SCNC: 106 MMOL/L (ref 94–109)
CO2 SERPL-SCNC: 26 MMOL/L (ref 20–32)
COLOR UR AUTO: YELLOW
CREAT SERPL-MCNC: 1.73 MG/DL (ref 0.66–1.25)
GFR SERPL CREATININE-BSD FRML MDRD: 47 ML/MIN/{1.73_M2}
GLUCOSE SERPL-MCNC: 88 MG/DL (ref 70–99)
GLUCOSE UR STRIP-MCNC: NEGATIVE MG/DL
HGB UR QL STRIP: ABNORMAL
KETONES UR STRIP-MCNC: NEGATIVE MG/DL
LEUKOCYTE ESTERASE UR QL STRIP: NEGATIVE
NITRATE UR QL: NEGATIVE
PH UR STRIP: 6 PH (ref 5–7)
PHOSPHATE SERPL-MCNC: 3.3 MG/DL (ref 2.5–4.5)
POTASSIUM SERPL-SCNC: 4.1 MMOL/L (ref 3.4–5.3)
RBC #/AREA URNS AUTO: NORMAL /HPF
SODIUM SERPL-SCNC: 140 MMOL/L (ref 133–144)
SOURCE: ABNORMAL
SP GR UR STRIP: 1.01 (ref 1–1.03)
UROBILINOGEN UR STRIP-ACNC: 0.2 EU/DL (ref 0.2–1)
WBC #/AREA URNS AUTO: NORMAL /HPF

## 2020-09-10 PROCEDURE — 99214 OFFICE O/P EST MOD 30 MIN: CPT | Performed by: NURSE PRACTITIONER

## 2020-09-10 PROCEDURE — 36415 COLL VENOUS BLD VENIPUNCTURE: CPT | Performed by: NURSE PRACTITIONER

## 2020-09-10 PROCEDURE — 81001 URINALYSIS AUTO W/SCOPE: CPT | Performed by: NURSE PRACTITIONER

## 2020-09-10 PROCEDURE — 82043 UR ALBUMIN QUANTITATIVE: CPT | Performed by: NURSE PRACTITIONER

## 2020-09-10 PROCEDURE — 80069 RENAL FUNCTION PANEL: CPT | Performed by: NURSE PRACTITIONER

## 2020-09-10 ASSESSMENT — MIFFLIN-ST. JEOR: SCORE: 1960.04

## 2020-09-10 NOTE — PROGRESS NOTES
"Subjective     Ilia Arnold is a 45 year old male who presents to clinic today for the following health issues:    HPI     Has been actively trying to lose weight since April- intermittent fasting, lifting and running.  2 weeks ago, developed R inguinal pain.  Pt does not feel hernia.  Pain is worsening.  No testicular pain, no problems going to the bathroom, no fever or chills. He does report mild kidney disease and is wondering if it could be r/t that.   Abdominal/Flank Pain  Onset/Duration: 1-2 weeks  Description:   Character: Dull ache  Location: right lower quadrant  Radiation: None  Intensity: moderate  Progression of Symptoms:  worsening and intermittent  Accompanying Signs & Symptoms:  Fever/Chills: no  Gas/Bloating: no  Nausea: no  Vomitting: no  Diarrhea: no  Constipation: no  Dysuria or Hematuria: no  History:   Trauma: no- but increase in exercise   Previous similar pain: no  Previous tests done: none  Precipitating factors:   Does the pain change with:     Food: no    Bowel Movement: no    Urination: no   Other factors:  no  Therapies tried and outcome: None      Review of Systems   Constitutional, HEENT, cardiovascular, pulmonary, GI, , musculoskeletal, neuro, skin, endocrine and psych systems are negative, except as otherwise noted.      Objective    BP (!) 145/80   Pulse 57   Temp 98  F (36.7  C) (Tympanic)   Resp 14   Ht 1.803 m (5' 11\")   Wt 105.3 kg (232 lb 2 oz)   SpO2 97%   BMI 32.37 kg/m    Body mass index is 32.37 kg/m .  Physical Exam   GENERAL: healthy, alert and no distress  RESP: lungs clear to auscultation - no rales, rhonchi or wheezes  CV: regular rate and rhythm, normal S1 S2, no S3 or S4, no murmur, click or rub, no peripheral edema and peripheral pulses strong  ABDOMEN: soft, nontender, no hepatosplenomegaly, no masses and bowel sounds normal, no rebound tenderness  : POSITIVE R inguinal pain with palpation, possible small hernia felt. Will have pt schedule US  MS: no " "gross musculoskeletal defects noted, no edema, no CVA tenderness  PSYCH: mentation appears normal, affect normal/bright    See orders- labs to evaluate kidneys, US to evaluate for hernia        Assessment & Plan     Ilia was seen today for abdominal pain.    Diagnoses and all orders for this visit:    Right inguinal pain  -     US Pelvic Limited; Future    Renal hypertension  -     *UA reflex to Microscopic and Culture ([place] [facility] (except [place] and [place])  -     Albumin Random Urine Quantitative with Creat Ratio  -     Renal panel (Alb, BUN, Ca, Cl, CO2, Creat, Gluc, Phos, K, Na)         BMI:   Estimated body mass index is 32.37 kg/m  as calculated from the following:    Height as of this encounter: 1.803 m (5' 11\").    Weight as of this encounter: 105.3 kg (232 lb 2 oz).   Weight management plan: Discussed healthy diet and exercise guidelines        See Patient Instructions: discussed will let him know results when I get them, and a treatment plan of abnormal findings. Follow up if worsening pain.     Return in about 4 weeks (around 10/8/2020), or if symptoms worsen or fail to improve.    Tatum Post, P  [facility]      "

## 2020-09-10 NOTE — PATIENT INSTRUCTIONS
Patient Education     Hernia (Adult)    A hernia can happen when there is a weakness or defect in the wall of the abdomen or groin. Intestines or nearby tissues may move from their usual location and push through the weakness in the wall. This can cause a hernia (bulge) you may see or feel.  Causes and risk factors   A hernia may be present at birth. Or it may be caused by the wear and tear of daily living. Certain factors can make a hernia more likely. These can include:    Heavy lifting    Straining, whether from lifting, movement, or constipation    Chronic cough    Injury to the abdominal wall    Excess weight    Pregnancy    Prior surgery    Older age    Family history of hernia  Symptoms  Symptoms of a hernia may come on suddenly. Or they may appear slowly over time. Some common symptoms include:    Bulge in the groin area, around the navel, or in the scrotum (the bulge may get bigger when you stand and go away when you lie down)    Pain or pressure around the bulge    Pain during activities such as lifting, coughing, or sneezing    A feeling of weakness or pressure in the groin    Pain or swelling in the scrotum  Types of hernias  There are different types of hernia. The type you have depends on its location:    Inguinal. This type is in the groin or scrotum. It is more common in men. But, women can get this hernia, too.    Femoral. This type is in the groin, upper thigh (where the leg bends), or labia. It is more common in women.    Ventral. This type is in the abdominal wall.    Umbilical. This type occurs around the navel (belly button).    Incisional. This type occurs at the site of a previous surgery.  The condition of the hernia can help determine how urgently it needs to be treated.    Reducible. It goes back in by itself, or it can be pushed back in.    Irreducible. It can t be pushed back in.    Incarcerated/strangulated. The intestine is trapped (incarcerated). If this happens, you won t be able  to push the bulge back in. If the incarcerated hernia isn t treated, it may become strangulated. This means the area loses blood supply and the tissue may die. This requires emergency surgery. You need treatment right away.  In most cases, a hernia will not heal on its own.You may need surgery to repair the defect in the abdominal wall or groin. You ll be told more about surgery, if needed.  If your symptoms are not severe, treatment may sometimes be delayed. In such cases, you will need regular follow-up visits with the provider. You ll be asked to keep track of your symptoms and to watch for signs of more serious problems. You may also be given guidelines similar to the home care instructions below.  Home care  To help keep a hernia from getting worse, you may be advised to:    Avoid heavy lifting and straining as directed.    Take steps to prevent constipation, such as eating more fiber and drinking more water. This may help reduce straining that can occur when having a bowel movement. Reducing straining may help keep your symptoms from getting worse.    Maintain a healthy weight or lose excess weight. This can help reduce strain on abdominal muscles and tissues.    Stop smoking. This can help prevent coughing that may also strain abdominal muscles and tissues.  Follow-up care  Follow up with your healthcare provider, or as directed. If imaging tests were done, they will be reviewed a doctor. You will be told the results and any new findings that may affect your care.  When to seek medical advice  Call your healthcare provider right away if any of these occur:    Hernia hardens, swells, or grows larger    Hernia can no longer be pushed back in    Pain moves to the lower right abdomen (just below the waistline), or spreads to the back  Call 911  Call 911 if any of these occur:    Severe pain, redness, or tenderness in the area near the hernia    Pain worsens quickly and doesn t get better    Inability to have a  bowel movement or pass gas    Fever of 100.4 F (38 C) or higher, or as directed by your healthcare provider  Date Last Reviewed: 3/1/2018    6977-8442 [employer]. [address] [place] [address], [place] [address]. All rights reserved. This information is not intended as a substitute for professional medical care. Always follow your healthcare professional's instructions.           Patient Education     Abdominal Pain    Abdominal pain is pain in the stomach or belly area. Everyone has this pain from time to time. In many cases it goes away on its own. But abdominal pain can sometimes be due to a serious problem, such as appendicitis. So it s important to know when to get help.  Causes of abdominal pain  There are many possible causes of abdominal pain. Common causes in adults include:    Constipation, diarrhea, or gas    Stomach acid flowing back up into the esophagus (acid reflux or heartburn)    Severe acid reflux, called GERD (gastroesophageal reflux disease)    A sore in the lining of the stomach or small intestine (peptic ulcer)    Inflammation of the gallbladder, liver, or pancreas    Gallstones or kidney stones    Appendicitis     Intestinal blockage     An internal organ pushing through a muscle or other tissue (hernia)    Urinary tract infections    In women, menstrual cramps, fibroids, ovarian cysts, pelvic inflammatory disease, or endometriosis    Inflammation or infection of the intestines, including Crohn's disease and ulcerative colitis    Irritable bowel syndrome  Diagnosing the cause of abdominal pain  Your healthcare provider will give you a physical exam help find the cause of your pain. If needed, you will have tests. Belly pain has many possible causes. So it can be hard to find the reason for your pain. Giving details about your pain can help. Tell your provider where and when you feel the pain, and what makes it better or worse. Also let your provider know if you have other symptoms such  as:    Fever    Tiredness    Upset stomach (nausea)    Vomiting    Changes in bathroom habits    Blood in the stool or black, tarry stool    Weight loss that you can't explain (involuntary weight loss?)  Also report any family history of stomach or intestinal problems, or cancers. Tell your provider about all your alcohol use and drug use. Tell your provider about all medicines you use, including herbs, vitamins, and supplements.   Treating abdominal pain  Some causes of pain need emergency medical treatment right away. These include appendicitis or a bowel blockage. Other problems can be treated with rest, fluids, or medicines. Your healthcare provider can give you specific instructions for treatment or self-care based on what is causing your pain.     If you have vomiting or diarrhea, sip water or other clear fluids. When you are ready to eat solid foods again, start with small amounts of easy-to-digest, low-fat foods. These include apple sauce, toast, or crackers.  When to get medical care  Call 911 or go to the hospital right away if you:    Can t pass stool and are vomiting    Are vomiting blood or have bloody diarrhea or black, tarry diarrhea    Have chest, neck, or shoulder pain    Feel like you might pass out    Have pain in your shoulder blades with nausea    Have sudden, severe belly pain    Have new, severe pain unlike any you have felt before    Have a belly that is rigid, hard, and hurts to touch  Call your healthcare provider if you have:    Pain for more than 5 days    Bloating for more than 2 days    Diarrhea for more than 5 days    A fever of 100.4 F (38 C) or higher, or as directed by your healthcare provider    Pain that gets worse    Weight loss for no reason    Continued lack of appetite    Blood in your stool  How to prevent abdominal pain  Here are some tips to help prevent abdominal pain:    Eat smaller amounts of food at each meal.    Don't eat greasy, fried, or other high-fat  foods.    Don't eat foods that give you gas.    Exercise regularly.    Drink plenty of fluids.  To help prevent GERD symptoms:    Quit smoking.    Reduce alcohol and foods that increase stomach acid.    Don't use aspirin or over-the-counter pain and fever medicines, if possible. This includes nonsteroidal anti-inflammatory drugs (NSAIDs).    Lose excess weight.    Finish eating at least 2 hours before you go to bed or lie down.    Raise the head of your bed.  Date Last Reviewed: 7/1/2016 2000-2019 [employer]. [address] [place] [address], [place] [address]. All rights reserved. This information is not intended as a substitute for professional medical care. Always follow your healthcare professional's instructions.

## 2020-09-11 LAB
CREAT UR-MCNC: 83 MG/DL
MICROALBUMIN UR-MCNC: 709 MG/L
MICROALBUMIN/CREAT UR: 850.12 MG/G CR (ref 0–17)

## 2020-09-14 ENCOUNTER — ANCILLARY PROCEDURE (OUTPATIENT)
Dept: ULTRASOUND IMAGING | Facility: CLINIC | Age: 45
End: 2020-09-14
Attending: NURSE PRACTITIONER
Payer: COMMERCIAL

## 2020-09-14 DIAGNOSIS — R10.31 RIGHT INGUINAL PAIN: ICD-10-CM

## 2020-09-14 PROCEDURE — 76857 US EXAM PELVIC LIMITED: CPT

## 2020-09-15 ENCOUNTER — MYC MEDICAL ADVICE (OUTPATIENT)
Dept: FAMILY MEDICINE | Facility: CLINIC | Age: 45
End: 2020-09-15

## 2020-09-15 NOTE — RESULT ENCOUNTER NOTE
Matteo Morillo,    Thank you for your recent office visit.    Here are your recent results.  Normal US per radiology-      IMPRESSION: High-resolution real-time ultrasound exam without and with  Valsalva shows no ultrasound evidence for right inguinal hernia. No  mass lesion or other abnormality in this area.    Pain must be due to groin strain.  Follow up as needed.     Feel free to contact me via [employer] or call the clinic at [phone].    Sincerely,    OCTAVIANO Hernandez, FNP-BC

## 2020-09-15 NOTE — RESULT ENCOUNTER NOTE
Matteo Morillo,    Thank you for your recent office visit.    Here are your recent results.  Labs consistent with previous.     Feel free to contact me via [employer] or call the clinic at [phone].    Sincerely,    OCTAVIANO Hernandez, FNP-BC

## 2020-09-20 ENCOUNTER — MYC REFILL (OUTPATIENT)
Dept: FAMILY MEDICINE | Facility: CLINIC | Age: 45
End: 2020-09-20

## 2020-09-20 DIAGNOSIS — M10.9 ACUTE GOUTY ARTHRITIS: ICD-10-CM

## 2020-09-22 NOTE — TELEPHONE ENCOUNTER
"Routing refill request to provider for review/approval because:  Labs out of range:  cr  Labs not current:  Uric acid    Medication pended for approval, 90 day supply with reminder.             Requested Prescriptions   Pending Prescriptions Disp Refills     allopurinol (ZYLOPRIM) 100 MG tablet 90 tablet 0     Sig: Take 1 tablet (100 mg) by mouth daily       Gout Agents Protocol Failed - 9/20/2020  4:21 PM        Failed - Has Uric Acid on file in past 12 months and value is less than 6     Recent Labs   Lab Test 02/20/18  1444   URIC 8.9*     If level is 6mg/dL or greater, ok to refill one time and refer to provider.           Failed - Normal serum creatinine on file in the past 12 months     Recent Labs   Lab Test 09/10/20  1417   CR 1.73*       Ok to refill medication if creatinine is low          Passed - CBC on file in past 12 months     Recent Labs   Lab Test 01/03/20  1331   WBC 7.7   RBC 5.69   HGB 17.1   HCT 49.8                    Passed - ALT on file in past 12 months     Recent Labs   Lab Test 01/03/20  1331   ALT 24             Passed - Recent (12 mo) or future (30 days) visit within the authorizing provider's specialty     Patient has had an office visit with the authorizing provider or a provider within the authorizing providers department within the previous 12 mos or has a future within next 30 days. See \"Patient Info\" tab in inbasket, or \"Choose Columns\" in Meds & Orders section of the refill encounter.              Passed - Medication is active on med list        Passed - Patient is age 18 or older             "

## 2020-09-23 RX ORDER — ALLOPURINOL 100 MG/1
100 TABLET ORAL DAILY
Qty: 90 TABLET | Refills: 0 | Status: SHIPPED | OUTPATIENT
Start: 2020-09-23 | End: 2021-01-05

## 2020-12-29 NOTE — TELEPHONE ENCOUNTER
Ilia said he is having a Gout flare-up that started a week ago.  He has tried to manage but it is not resolving.  Leaving for a hiking trip.  Please call to discuss or a confirmation call once prescription has been sent to his pharmacy.  Thank you.    Per pt mother, pt started on Sunday with a runny nose and sore throat. Headache started later on Sunday.  Yesterday pt woke up with a cough.  Pt was exposed with a positive covid in her  class.

## 2021-01-01 DIAGNOSIS — M10.9 ACUTE GOUTY ARTHRITIS: ICD-10-CM

## 2021-01-05 NOTE — TELEPHONE ENCOUNTER
Routing refill request to provider for review/approval because:  Last seen by Lazaro Ty 1-3-20        Gout Agents Protocol [id number]/01/2021 10:10 AM  x Has Uric Acid on file in past 12 months and value is less than 6 Protocol Details   x Normal serum creatinine on file in the past 12 months

## 2021-01-06 ENCOUNTER — MYC MEDICAL ADVICE (OUTPATIENT)
Dept: FAMILY MEDICINE | Facility: CLINIC | Age: 46
End: 2021-01-06

## 2021-01-06 RX ORDER — ALLOPURINOL 100 MG/1
TABLET ORAL
Qty: 30 TABLET | Refills: 0 | Status: SHIPPED | OUTPATIENT
Start: 2021-01-06 | End: 2021-02-10

## 2021-01-10 ENCOUNTER — HEALTH MAINTENANCE LETTER (OUTPATIENT)
Age: 46
End: 2021-01-10

## 2021-02-10 ENCOUNTER — ANCILLARY PROCEDURE (OUTPATIENT)
Dept: GENERAL RADIOLOGY | Facility: CLINIC | Age: 46
End: 2021-02-10
Attending: PHYSICIAN ASSISTANT
Payer: COMMERCIAL

## 2021-02-10 ENCOUNTER — OFFICE VISIT (OUTPATIENT)
Dept: FAMILY MEDICINE | Facility: CLINIC | Age: 46
End: 2021-02-10
Payer: COMMERCIAL

## 2021-02-10 VITALS
WEIGHT: 248 LBS | TEMPERATURE: 98.4 F | HEART RATE: 70 BPM | DIASTOLIC BLOOD PRESSURE: 78 MMHG | BODY MASS INDEX: 34.59 KG/M2 | SYSTOLIC BLOOD PRESSURE: 126 MMHG | OXYGEN SATURATION: 96 % | RESPIRATION RATE: 20 BRPM

## 2021-02-10 DIAGNOSIS — E66.01 MORBID OBESITY (H): ICD-10-CM

## 2021-02-10 DIAGNOSIS — G62.9 PERIPHERAL POLYNEUROPATHY: ICD-10-CM

## 2021-02-10 DIAGNOSIS — N18.32 STAGE 3B CHRONIC KIDNEY DISEASE (H): ICD-10-CM

## 2021-02-10 DIAGNOSIS — G62.9 PERIPHERAL POLYNEUROPATHY: Primary | ICD-10-CM

## 2021-02-10 DIAGNOSIS — M10.9 ACUTE GOUTY ARTHRITIS: ICD-10-CM

## 2021-02-10 DIAGNOSIS — E78.5 HYPERLIPIDEMIA LDL GOAL <130: ICD-10-CM

## 2021-02-10 DIAGNOSIS — I10 BENIGN ESSENTIAL HYPERTENSION: ICD-10-CM

## 2021-02-10 LAB
ALBUMIN SERPL-MCNC: 3.8 G/DL (ref 3.4–5)
ALP SERPL-CCNC: 81 U/L (ref 40–150)
ALT SERPL W P-5'-P-CCNC: 32 U/L (ref 0–70)
ANION GAP SERPL CALCULATED.3IONS-SCNC: 4 MMOL/L (ref 3–14)
AST SERPL W P-5'-P-CCNC: 24 U/L (ref 0–45)
BASOPHILS # BLD AUTO: 0 10E9/L (ref 0–0.2)
BASOPHILS NFR BLD AUTO: 0.6 %
BILIRUB SERPL-MCNC: 0.4 MG/DL (ref 0.2–1.3)
BUN SERPL-MCNC: 30 MG/DL (ref 7–30)
CALCIUM SERPL-MCNC: 9 MG/DL (ref 8.5–10.1)
CHLORIDE SERPL-SCNC: 109 MMOL/L (ref 94–109)
CO2 SERPL-SCNC: 28 MMOL/L (ref 20–32)
CREAT SERPL-MCNC: 1.88 MG/DL (ref 0.66–1.25)
CRP SERPL-MCNC: 9.2 MG/L (ref 0–8)
DIFFERENTIAL METHOD BLD: NORMAL
EOSINOPHIL # BLD AUTO: 0.1 10E9/L (ref 0–0.7)
EOSINOPHIL NFR BLD AUTO: 1.2 %
ERYTHROCYTE [DISTWIDTH] IN BLOOD BY AUTOMATED COUNT: 13.2 % (ref 10–15)
FERRITIN SERPL-MCNC: 139 NG/ML (ref 26–388)
GFR SERPL CREATININE-BSD FRML MDRD: 42 ML/MIN/{1.73_M2}
GLUCOSE SERPL-MCNC: 90 MG/DL (ref 70–99)
HBA1C MFR BLD: 4.9 % (ref 0–5.6)
HCT VFR BLD AUTO: 49.3 % (ref 40–53)
HGB BLD-MCNC: 16.5 G/DL (ref 13.3–17.7)
LYMPHOCYTES # BLD AUTO: 1.7 10E9/L (ref 0.8–5.3)
LYMPHOCYTES NFR BLD AUTO: 25.7 %
MCH RBC QN AUTO: 29.9 PG (ref 26.5–33)
MCHC RBC AUTO-ENTMCNC: 33.5 G/DL (ref 31.5–36.5)
MCV RBC AUTO: 89 FL (ref 78–100)
MONOCYTES # BLD AUTO: 0.4 10E9/L (ref 0–1.3)
MONOCYTES NFR BLD AUTO: 5.8 %
NEUTROPHILS # BLD AUTO: 4.5 10E9/L (ref 1.6–8.3)
NEUTROPHILS NFR BLD AUTO: 66.7 %
PLATELET # BLD AUTO: 215 10E9/L (ref 150–450)
POTASSIUM SERPL-SCNC: 4.5 MMOL/L (ref 3.4–5.3)
PROT SERPL-MCNC: 7.6 G/DL (ref 6.8–8.8)
RBC # BLD AUTO: 5.52 10E12/L (ref 4.4–5.9)
SODIUM SERPL-SCNC: 141 MMOL/L (ref 133–144)
TSH SERPL DL<=0.005 MIU/L-ACNC: 1.62 MU/L (ref 0.4–4)
VIT B12 SERPL-MCNC: 471 PG/ML (ref 193–986)
WBC # BLD AUTO: 6.7 10E9/L (ref 4–11)

## 2021-02-10 PROCEDURE — 80050 GENERAL HEALTH PANEL: CPT | Performed by: PHYSICIAN ASSISTANT

## 2021-02-10 PROCEDURE — 99214 OFFICE O/P EST MOD 30 MIN: CPT | Performed by: PHYSICIAN ASSISTANT

## 2021-02-10 PROCEDURE — 99000 SPECIMEN HANDLING OFFICE-LAB: CPT | Performed by: PHYSICIAN ASSISTANT

## 2021-02-10 PROCEDURE — 72100 X-RAY EXAM L-S SPINE 2/3 VWS: CPT | Performed by: RADIOLOGY

## 2021-02-10 PROCEDURE — 83036 HEMOGLOBIN GLYCOSYLATED A1C: CPT | Performed by: PHYSICIAN ASSISTANT

## 2021-02-10 PROCEDURE — 82607 VITAMIN B-12: CPT | Performed by: PHYSICIAN ASSISTANT

## 2021-02-10 PROCEDURE — 86780 TREPONEMA PALLIDUM: CPT | Performed by: PHYSICIAN ASSISTANT

## 2021-02-10 PROCEDURE — 36415 COLL VENOUS BLD VENIPUNCTURE: CPT | Performed by: PHYSICIAN ASSISTANT

## 2021-02-10 PROCEDURE — 82306 VITAMIN D 25 HYDROXY: CPT | Performed by: PHYSICIAN ASSISTANT

## 2021-02-10 PROCEDURE — 86140 C-REACTIVE PROTEIN: CPT | Performed by: PHYSICIAN ASSISTANT

## 2021-02-10 PROCEDURE — 86618 LYME DISEASE ANTIBODY: CPT | Performed by: PHYSICIAN ASSISTANT

## 2021-02-10 PROCEDURE — 82728 ASSAY OF FERRITIN: CPT | Performed by: PHYSICIAN ASSISTANT

## 2021-02-10 RX ORDER — LOSARTAN POTASSIUM 100 MG/1
100 TABLET ORAL DAILY
Qty: 90 TABLET | Refills: 3 | Status: SHIPPED | OUTPATIENT
Start: 2021-02-10 | End: 2022-02-15

## 2021-02-10 RX ORDER — ALLOPURINOL 100 MG/1
TABLET ORAL
Qty: 90 TABLET | Refills: 3 | Status: SHIPPED | OUTPATIENT
Start: 2021-02-10 | End: 2022-02-15

## 2021-02-10 RX ORDER — SIMVASTATIN 40 MG
40 TABLET ORAL AT BEDTIME
Qty: 90 TABLET | Refills: 3 | Status: SHIPPED | OUTPATIENT
Start: 2021-02-10 | End: 2022-02-15

## 2021-02-10 RX ORDER — GABAPENTIN 100 MG/1
100 CAPSULE ORAL 3 TIMES DAILY
Qty: 90 CAPSULE | Refills: 1 | Status: SHIPPED | OUTPATIENT
Start: 2021-02-10 | End: 2022-09-01

## 2021-02-10 NOTE — PROGRESS NOTES
Tri Morillo is a 45 year old who presents for the following health issues    HPI       Musculoskeletal problem/pain  Onset/Duration: 1 month  Description  Location: Bilateral leg/feet   Joint Swelling: no  Redness: no  Pain: feel tight  Warmth: no  Intensity:  mild  Progression of Symptoms:  same and intermittent  Accompanying signs and symptoms:   Fevers: no  Numbness/tingling/weakness: YES  History  Trauma to the area: no  Recent illness:  no  Previous similar problem: no  Previous evaluation:  No, other than gout  Precipitating or alleviating factors:  Aggravating factors include: none  Therapies tried and outcome: ice    6 weeks of paresthesia/dysesthesia  Involving feet and legs. Squeezing sensation in calves. Occurs in waves. No symptoms when sitting. No low back pain or neck pain. No weakness. No headaches or dizziness. No leg swelling. Minimal fatigue. No rashes. No tick bites. No n/v/d/c/. No chest pain/sob/palps. No vision or hearing changes. No weight changes. No fevers.     Review of Systems   Constitutional, HEENT, cardiovascular, pulmonary, GI, , musculoskeletal, neuro, skin, endocrine and psych systems are negative, except as otherwise noted.      Objective    /78   Pulse 70   Temp 98.4  F (36.9  C) (Tympanic)   Resp 20   Wt 112.5 kg (248 lb)   SpO2 96%   BMI 34.59 kg/m    Body mass index is 34.59 kg/m .  Physical Exam   Eye exam - right eye normal lid, conjunctiva, cornea, pupil and fundus, left eye normal lid, conjunctiva, cornea, pupil and fundus.  Thyroid not palpable, not enlarged, no nodules detected.  CHEST:chest clear to IPPA, no tachypnea, retractions or cyanosis and S1, S2 normal, no murmur, no gallop, rate regular.  Lumbosacral spine area reveals no local tenderness or mass.  Full and painless lumbosacral range of motion is noted. Straight leg raise is negative at 90 degrees on both sides. DTR's, motor strength and sensation normal, including heel and toe gait.   Peripheral pulses are palpable. Hips and knees have full range of motion without pain. No abdominal tenderness, mass or organomegaly.  His disks are flat. Pupils equal, round, reactive to light. Extraocular movements full. Visual fields full. Face moves symmetrically. Tongue midline. Hearing mildly decreased to finger-rubbing at approximately 6-8 inches. Neck without bruits. CV: S1, S2. Motor strength 5/5. Reflexes were 2/4. Toe signs were downgoing. Normal position sense. Good finger-nose-finger and fine finger movement. Gait: he anais from a chair without difficulty and has a mildly broad-based gait.  Foot exam - both sides normal; no swelling, tenderness or skin or vascular lesions. Color and temperature is normal. Sensation is intact. Peripheral pulses are palpable. Toenails are normal.        Ilia was seen today for musculoskeletal problem and numbness.    Diagnoses and all orders for this visit:    Peripheral polyneuropathy  -     CBC with platelets and differential  -     Vitamin D Deficiency  -     TSH with free T4 reflex  -     Hemoglobin A1c  -     Vitamin B12  -     Ferritin  -     CRP inflammation  -     Comprehensive metabolic panel (BMP + Alb, Alk Phos, ALT, AST, Total. Bili, TP)  -     Lyme Disease Magy with reflex to WB Serum  -     Treponema Abs w Reflex to RPR and Titer  -     XR Lumbar Spine 2/3 Views; Future  -     gabapentin (NEURONTIN) 100 MG capsule; Take 1 capsule (100 mg) by mouth 3 times daily  -     NEUROLOGY ADULT REFERRAL    Benign essential hypertension  -     losartan (COZAAR) 100 MG tablet; Take 1 tablet (100 mg) by mouth daily  -     simvastatin (ZOCOR) 40 MG tablet; Take 1 tablet (40 mg) by mouth At Bedtime    Hyperlipidemia LDL goal <130  -     simvastatin (ZOCOR) 40 MG tablet; Take 1 tablet (40 mg) by mouth At Bedtime    Acute gouty arthritis  -     allopurinol (ZYLOPRIM) 100 MG tablet; TAKE ONE TABLET BY MOUTH ONCE DAILY    Stage 3b chronic kidney disease    Morbid obesity  (H)      work on lifestyle modification  Advised supportive and symptomatic treatment.  Follow up with Provider - if condition persists or worsens.

## 2021-02-11 LAB
B BURGDOR IGG+IGM SER QL: 0.07 (ref 0–0.89)
DEPRECATED CALCIDIOL+CALCIFEROL SERPL-MC: 40 UG/L (ref 20–75)
T PALLIDUM AB SER QL: NONREACTIVE

## 2021-07-29 DIAGNOSIS — N52.9 ERECTILE DYSFUNCTION, UNSPECIFIED ERECTILE DYSFUNCTION TYPE: ICD-10-CM

## 2021-07-29 RX ORDER — SILDENAFIL CITRATE 20 MG/1
TABLET ORAL
Qty: 30 TABLET | Refills: 0 | Status: SHIPPED | OUTPATIENT
Start: 2021-07-29 | End: 2021-10-05

## 2021-07-30 NOTE — TELEPHONE ENCOUNTER
Prescription approved per [facility] Refill Protocol.  Marialuisa Venegas RN

## 2021-10-04 DIAGNOSIS — N52.9 ERECTILE DYSFUNCTION, UNSPECIFIED ERECTILE DYSFUNCTION TYPE: ICD-10-CM

## 2021-10-05 RX ORDER — SILDENAFIL CITRATE 20 MG/1
TABLET ORAL
Qty: 30 TABLET | Refills: 0 | Status: SHIPPED | OUTPATIENT
Start: 2021-10-05 | End: 2022-02-15

## 2021-10-23 ENCOUNTER — HEALTH MAINTENANCE LETTER (OUTPATIENT)
Age: 46
End: 2021-10-23

## 2022-01-12 NOTE — TELEPHONE ENCOUNTER
Medication refilled for 1 month with reminder appt due a physical  Please send letter of reminder     Orthopaedics  Post-operative follow-up    Procedure Performed:  1.  Right patella revision ORIF  2.  Left distal radius ORIF with dorsal spanning plate, subsequent plate removal  3.  Left proximal humerus ORIF with IMN, subsequent interlocking screw removal    Date of Surgery:   12/18/2020 revision right patella  3/11/2021 removal of left distal radius plate and left proximal humerus interlock screw    Subjective: Overall she is doing very well. She is walking without any assistive devices and performing ADL's without issues. She endorses full ROM of the knee. She does report some discomfort with rising from a low chair or prolonged walking up or down hill.     Exam:  Left upper extremity:  incision sites healed, C/D/I  Finger ROM intact, able to make a fist,  strength still slightly limited  Shoulder passive ROM fluid without crepitus  Active shoulder ROM: FF 90, ER 60, ABD 70  Axillary nerve sensation and motor intact  Motor and sensory intact distally  Strong radial pulse, fingers warm and well perfused    Right Knee:  Incision healed, no erythema or induration  ROM 0-130  No tenderness to palpation  Stable to varus/valgus stress  Patella is mobile     Imaging:  X-ray Knee Ortho Right with Flexion  Narrative: EXAMINATION:  XR KNEE ORTHO RIGHT WITH FLEXION    CLINICAL HISTORY:  Displaced comminuted fracture of right patella, sequela    TECHNIQUE:  AP standing views of both knees, AP flexion views of both knees, lateral view of the right knee and Merchant views of both knees    COMPARISON:  06/30/2021    FINDINGS:  There are postoperative changes seen associated with the patella.  The more medially located screw that is inserted from an inferior to superior approach is fractured.  Fracture fragment alignment is stable when compared to the prior study.  There appears to be increased callus formation best appreciated on the lateral film.  A small suprapatellar joint effusion may still be present.  No acute  fracture or dislocation.  Impression: 1.  As above    Electronically signed by: Twin Haile DO  Date:    01/11/2022  Time:    16:10        Impression:  S/p right patella ORIF, left distal radius dorsal spanning plate s/p removal and left proximal humerus fx IMN s/p interlocking screw removal    Plan:  Overall we are both happy with her outcome. She has made significant progress since her original injury and is back to doing all things she wants.  She does have some PF degenerative changes and intermittent symptoms related to this. XR today demonstrates full healing of the patella.  I congratulated her on her progress today. No restrictions. She may proceed with activities as tolerated.  Instructed patient to call clinic if questions or concerns    Follow-up with me as needed          Sage Wolf MD

## 2022-02-12 ENCOUNTER — HEALTH MAINTENANCE LETTER (OUTPATIENT)
Age: 47
End: 2022-02-12

## 2022-02-14 DIAGNOSIS — N52.9 ERECTILE DYSFUNCTION, UNSPECIFIED ERECTILE DYSFUNCTION TYPE: ICD-10-CM

## 2022-02-14 DIAGNOSIS — M10.9 ACUTE GOUTY ARTHRITIS: ICD-10-CM

## 2022-02-14 DIAGNOSIS — I10 BENIGN ESSENTIAL HYPERTENSION: ICD-10-CM

## 2022-02-14 DIAGNOSIS — E78.5 HYPERLIPIDEMIA LDL GOAL <130: ICD-10-CM

## 2022-02-16 RX ORDER — SILDENAFIL CITRATE 20 MG/1
TABLET ORAL
Qty: 30 TABLET | Refills: 0 | Status: SHIPPED | OUTPATIENT
Start: 2022-02-16 | End: 2022-11-22

## 2022-02-16 RX ORDER — ALLOPURINOL 100 MG/1
TABLET ORAL
Qty: 30 TABLET | Refills: 0 | Status: SHIPPED | OUTPATIENT
Start: 2022-02-16 | End: 2022-03-31

## 2022-02-16 RX ORDER — LOSARTAN POTASSIUM 100 MG/1
100 TABLET ORAL DAILY
Qty: 30 TABLET | Refills: 0 | Status: SHIPPED | OUTPATIENT
Start: 2022-02-16 | End: 2022-03-31

## 2022-02-16 RX ORDER — SIMVASTATIN 40 MG
40 TABLET ORAL AT BEDTIME
Qty: 30 TABLET | Refills: 0 | Status: SHIPPED | OUTPATIENT
Start: 2022-02-16 | End: 2022-03-20

## 2022-02-16 NOTE — TELEPHONE ENCOUNTER
"Routing refill request to provider for review/approval because:  Labs not current  Patient needs to be seen because it has been more than 1 year since last office visit.  2/10/21    Medication pended for approval, 30 day supply with reminder.     Requested Prescriptions   Pending Prescriptions Disp Refills     allopurinol (ZYLOPRIM) 100 MG tablet [Pharmacy Med Name: ALLOPURINOL 100MG TABS] 30 tablet 0     Sig: TAKE ONE TABLET BY MOUTH ONCE DAILY       Gout Agents Protocol Failed - 2/15/2022 11:12 AM        Failed - CBC on file in past 12 months     Recent Labs   Lab Test 02/10/21  1343   WBC 6.7   RBC 5.52   HGB 16.5   HCT 49.3                    Failed - ALT on file in past 12 months     Recent Labs   Lab Test 02/10/21  1343   ALT 32             Failed - Has Uric Acid on file in past 12 months and value is less than 6     Recent Labs   Lab Test 02/20/18  1444   URIC 8.9*     If level is 6mg/dL or greater, ok to refill one time and refer to provider.           Failed - Recent (12 mo) or future (30 days) visit within the authorizing provider's specialty     Patient has had an office visit with the authorizing provider or a provider within the authorizing providers department within the previous 12 mos or has a future within next 30 days. See \"Patient Info\" tab in inbasket, or \"Choose Columns\" in Meds & Orders section of the refill encounter.              Failed - Normal serum creatinine on file in the past 12 months     Recent Labs   Lab Test 02/10/21  1343   CR 1.88*       Ok to refill medication if creatinine is low          Passed - Medication is active on med list        Passed - Patient is age 18 or older           simvastatin (ZOCOR) 40 MG tablet [Pharmacy Med Name: SIMVASTATIN 40MG TABS] 30 tablet 0     Sig: TAKE 1 TABLET (40 MG) BY MOUTH AT BEDTIME       Statins Protocol Failed - 2/15/2022 11:12 AM        Failed - LDL on file in past 12 months     Recent Labs   Lab Test 06/30/17  1043   *      " "       Failed - Recent (12 mo) or future (30 days) visit within the authorizing provider's specialty     Patient has had an office visit with the authorizing provider or a provider within the authorizing providers department within the previous 12 mos or has a future within next 30 days. See \"Patient Info\" tab in inbasket, or \"Choose Columns\" in Meds & Orders section of the refill encounter.              Passed - No abnormal creatine kinase in past 12 months     No lab results found.             Passed - Medication is active on med list        Passed - Patient is age 18 or older           sildenafil (REVATIO) 20 MG tablet [Pharmacy Med Name: SILDENAFIL CITRATE 20MG TABS] 30 tablet 0     Sig: TAKE 2 TO 5 TABLETS BY MOUTH DAILY AS NEEDED (MAX 100MG PER 24 HOURS)       Erectile Dysfuction Protocol Failed - 2/15/2022 11:12 AM        Failed - Recent (12 mo) or future (30 days) visit within the authorizing provider's specialty     Patient has had an office visit with the authorizing provider or a provider within the authorizing providers department within the previous 12 mos or has a future within next 30 days. See \"Patient Info\" tab in inbasket, or \"Choose Columns\" in Meds & Orders section of the refill encounter.              Passed - Absence of nitrates on medication list        Passed - Absence of Alpha Blockers on Med list        Passed - Medication is active on med list        Passed - Patient is age 18 or older           losartan (COZAAR) 100 MG tablet [Pharmacy Med Name: LOSARTAN POTASSIUM 100MG TABS] 30 tablet 0     Sig: TAKE 1 TABLET (100 MG) BY MOUTH DAILY       Angiotensin-II Receptors Failed - 2/15/2022 11:12 AM        Failed - Last blood pressure under 140/90 in past 12 months     BP Readings from Last 3 Encounters:   02/10/21 126/78   09/10/20 122/71   01/03/20 (!) 144/88                 Failed - Recent (12 mo) or future (30 days) visit within the authorizing provider's specialty     Patient has had an " "office visit with the authorizing provider or a provider within the authorizing providers department within the previous 12 mos or has a future within next 30 days. See \"Patient Info\" tab in inbasket, or \"Choose Columns\" in Meds & Orders section of the refill encounter.              Failed - Normal serum creatinine on file in past 12 months     Recent Labs   Lab Test 02/10/21  1343   CR 1.88*       Ok to refill medication if creatinine is low          Failed - Normal serum potassium on file in past 12 months     Recent Labs   Lab Test 02/10/21  1343   POTASSIUM 4.5                    Passed - Medication is active on med list        Passed - Patient is age 18 or older                 "

## 2022-02-23 ENCOUNTER — OFFICE VISIT (OUTPATIENT)
Dept: FAMILY MEDICINE | Facility: CLINIC | Age: 47
End: 2022-02-23
Payer: COMMERCIAL

## 2022-02-23 VITALS
HEART RATE: 68 BPM | DIASTOLIC BLOOD PRESSURE: 85 MMHG | SYSTOLIC BLOOD PRESSURE: 131 MMHG | WEIGHT: 263.2 LBS | BODY MASS INDEX: 36.85 KG/M2 | TEMPERATURE: 98.5 F | OXYGEN SATURATION: 97 % | HEIGHT: 71 IN | RESPIRATION RATE: 20 BRPM

## 2022-02-23 DIAGNOSIS — Z12.11 SCREEN FOR COLON CANCER: ICD-10-CM

## 2022-02-23 DIAGNOSIS — N18.32 STAGE 3B CHRONIC KIDNEY DISEASE (H): ICD-10-CM

## 2022-02-23 DIAGNOSIS — E78.5 HYPERLIPIDEMIA LDL GOAL <130: ICD-10-CM

## 2022-02-23 DIAGNOSIS — R10.13 EPIGASTRIC PAIN: Primary | ICD-10-CM

## 2022-02-23 DIAGNOSIS — Z13.220 SCREENING FOR HYPERLIPIDEMIA: ICD-10-CM

## 2022-02-23 DIAGNOSIS — K29.00 ACUTE SUPERFICIAL GASTRITIS WITHOUT HEMORRHAGE: ICD-10-CM

## 2022-02-23 LAB
ALBUMIN SERPL-MCNC: 3.6 G/DL (ref 3.4–5)
ALP SERPL-CCNC: 84 U/L (ref 40–150)
ALT SERPL W P-5'-P-CCNC: 38 U/L (ref 0–70)
ANION GAP SERPL CALCULATED.3IONS-SCNC: 7 MMOL/L (ref 3–14)
AST SERPL W P-5'-P-CCNC: 24 U/L (ref 0–45)
BASOPHILS # BLD AUTO: 0.1 10E3/UL (ref 0–0.2)
BASOPHILS NFR BLD AUTO: 1 %
BILIRUB SERPL-MCNC: 0.4 MG/DL (ref 0.2–1.3)
BUN SERPL-MCNC: 21 MG/DL (ref 7–30)
CALCIUM SERPL-MCNC: 8.9 MG/DL (ref 8.5–10.1)
CHLORIDE BLD-SCNC: 108 MMOL/L (ref 94–109)
CHOLEST SERPL-MCNC: 278 MG/DL
CO2 SERPL-SCNC: 26 MMOL/L (ref 20–32)
CREAT SERPL-MCNC: 1.81 MG/DL (ref 0.66–1.25)
CREAT UR-MCNC: 84 MG/DL
EOSINOPHIL # BLD AUTO: 0.1 10E3/UL (ref 0–0.7)
EOSINOPHIL NFR BLD AUTO: 1 %
ERYTHROCYTE [DISTWIDTH] IN BLOOD BY AUTOMATED COUNT: 13 % (ref 10–15)
FASTING STATUS PATIENT QL REPORTED: YES
GFR SERPL CREATININE-BSD FRML MDRD: 46 ML/MIN/1.73M2
GLUCOSE BLD-MCNC: 86 MG/DL (ref 70–99)
HCT VFR BLD AUTO: 49.1 % (ref 40–53)
HDLC SERPL-MCNC: 44 MG/DL
HGB BLD-MCNC: 16.2 G/DL (ref 13.3–17.7)
LDLC SERPL CALC-MCNC: 174 MG/DL
LIPASE SERPL-CCNC: 177 U/L (ref 73–393)
LYMPHOCYTES # BLD AUTO: 1.7 10E3/UL (ref 0.8–5.3)
LYMPHOCYTES NFR BLD AUTO: 25 %
MCH RBC QN AUTO: 29.6 PG (ref 26.5–33)
MCHC RBC AUTO-ENTMCNC: 33 G/DL (ref 31.5–36.5)
MCV RBC AUTO: 90 FL (ref 78–100)
MICROALBUMIN UR-MCNC: 819 MG/L
MICROALBUMIN/CREAT UR: 975 MG/G CR (ref 0–17)
MONOCYTES # BLD AUTO: 0.4 10E3/UL (ref 0–1.3)
MONOCYTES NFR BLD AUTO: 6 %
NEUTROPHILS # BLD AUTO: 4.7 10E3/UL (ref 1.6–8.3)
NEUTROPHILS NFR BLD AUTO: 67 %
NONHDLC SERPL-MCNC: 234 MG/DL
PLATELET # BLD AUTO: 205 10E3/UL (ref 150–450)
POTASSIUM BLD-SCNC: 4.4 MMOL/L (ref 3.4–5.3)
PROT SERPL-MCNC: 7.1 G/DL (ref 6.8–8.8)
RBC # BLD AUTO: 5.48 10E6/UL (ref 4.4–5.9)
SODIUM SERPL-SCNC: 141 MMOL/L (ref 133–144)
TRIGL SERPL-MCNC: 301 MG/DL
WBC # BLD AUTO: 7 10E3/UL (ref 4–11)

## 2022-02-23 PROCEDURE — 85025 COMPLETE CBC W/AUTO DIFF WBC: CPT | Performed by: PHYSICIAN ASSISTANT

## 2022-02-23 PROCEDURE — 82043 UR ALBUMIN QUANTITATIVE: CPT | Performed by: PHYSICIAN ASSISTANT

## 2022-02-23 PROCEDURE — 80061 LIPID PANEL: CPT | Performed by: PHYSICIAN ASSISTANT

## 2022-02-23 PROCEDURE — 99214 OFFICE O/P EST MOD 30 MIN: CPT | Performed by: PHYSICIAN ASSISTANT

## 2022-02-23 PROCEDURE — 36415 COLL VENOUS BLD VENIPUNCTURE: CPT | Performed by: PHYSICIAN ASSISTANT

## 2022-02-23 PROCEDURE — 80053 COMPREHEN METABOLIC PANEL: CPT | Performed by: PHYSICIAN ASSISTANT

## 2022-02-23 PROCEDURE — 83690 ASSAY OF LIPASE: CPT | Performed by: PHYSICIAN ASSISTANT

## 2022-02-23 RX ORDER — FAMOTIDINE 40 MG/1
40 TABLET, FILM COATED ORAL DAILY
Qty: 90 TABLET | Refills: 0 | Status: SHIPPED | OUTPATIENT
Start: 2022-02-23 | End: 2022-11-22

## 2022-02-23 RX ORDER — SUCRALFATE 1 G/1
1 TABLET ORAL 3 TIMES DAILY
Qty: 90 TABLET | Refills: 0 | Status: SHIPPED | OUTPATIENT
Start: 2022-02-23 | End: 2022-03-25

## 2022-02-23 ASSESSMENT — PAIN SCALES - GENERAL: PAINLEVEL: MODERATE PAIN (4)

## 2022-02-23 NOTE — PROGRESS NOTES
"    Tri Morillo is a 46 year old who presents for the following health issues  History of Present Illness     Reason for visit:  Stomach cramping  Symptom onset:  1-2 weeks ago    He eats 0-1 servings of fruits and vegetables daily.He consumes 2 sweetened beverage(s) daily.He exercises with enough effort to increase his heart rate 10 to 19 minutes per day.  He exercises with enough effort to increase his heart rate 3 or less days per week.   He is taking medications regularly.     JOHN pain after eating. Bloating and belching. No diarrhea or constipation. No exertional chest pain. No obvious gerd. No weight changes. Stop drinking 10 weeks ago.  No weight loss. No fevers.     Review of Systems   Constitutional, HEENT, cardiovascular, pulmonary, GI, , musculoskeletal, neuro, skin, endocrine and psych systems are negative, except as otherwise noted.      Objective    /85   Pulse 68   Temp 98.5  F (36.9  C) (Tympanic)   Resp 20   Ht 1.803 m (5' 11\")   Wt 119.4 kg (263 lb 3.2 oz)   SpO2 97%   BMI 36.71 kg/m    Body mass index is 36.71 kg/m .  Physical Exam   Eye exam - right eye normal lid, conjunctiva, cornea, pupil and fundus, left eye normal lid, conjunctiva, cornea, pupil and fundus.  Thyroid not palpable, not enlarged, no nodules detected.  CHEST:chest clear to IPPA, no tachypnea, retractions or cyanosis and S1, S2 normal, no murmur, no gallop, rate regular.  ABDOMEN: mild tenderness in the epigastric area, without rebound, guarding, mass or organomegaly. Abdomen is soft and bowel sounds are normal.    Ilia was seen today for abdominal pain.    Diagnoses and all orders for this visit:    Epigastric pain  -     Comprehensive metabolic panel (BMP + Alb, Alk Phos, ALT, AST, Total. Bili, TP); Future  -     Lipase; Future  -     Helicobacter pylori Antigen Stool; Future  -     omeprazole (PRILOSEC) 20 MG DR capsule; Take 1 capsule (20 mg) by mouth daily  -     famotidine (PEPCID) 40 MG tablet; Take " 1 tablet (40 mg) by mouth daily  -     sucralfate (CARAFATE) 1 GM tablet; Take 1 tablet (1 g) by mouth 3 times daily  -     CBC with platelets and differential; Future  -     US Abdomen Limited; Future    Screen for colon cancer    Screening for hyperlipidemia  -     Lipid panel reflex to direct LDL Fasting; Future    Acute superficial gastritis without hemorrhage  -     omeprazole (PRILOSEC) 20 MG DR capsule; Take 1 capsule (20 mg) by mouth daily  -     famotidine (PEPCID) 40 MG tablet; Take 1 tablet (40 mg) by mouth daily  -     sucralfate (CARAFATE) 1 GM tablet; Take 1 tablet (1 g) by mouth 3 times daily    Stage 3b chronic kidney disease (H)  -     Albumin Random Urine Quantitative with Creat Ratio; Future    Other orders  -     REVIEW OF HEALTH MAINTENANCE PROTOCOL ORDERS      work on lifestyle modification  Advised supportive and symptomatic treatment.  Follow up with Provider - if condition persists or worsens.

## 2022-03-02 ENCOUNTER — ANCILLARY PROCEDURE (OUTPATIENT)
Dept: ULTRASOUND IMAGING | Facility: CLINIC | Age: 47
End: 2022-03-02
Attending: PHYSICIAN ASSISTANT
Payer: COMMERCIAL

## 2022-03-02 DIAGNOSIS — R10.13 EPIGASTRIC PAIN: ICD-10-CM

## 2022-03-02 PROCEDURE — 76705 ECHO EXAM OF ABDOMEN: CPT | Performed by: RADIOLOGY

## 2022-03-20 RX ORDER — ROSUVASTATIN CALCIUM 40 MG/1
40 TABLET, COATED ORAL DAILY
Qty: 90 TABLET | Refills: 1 | Status: SHIPPED | OUTPATIENT
Start: 2022-03-20 | End: 2022-09-01

## 2022-08-29 NOTE — PROGRESS NOTES
Subjective   Ilia is a 46 year old, presenting for the following health issues:  RECHECK      History of Present Illness       Hypertension: He presents for follow up of hypertension.  He does not check blood pressure  regularly outside of the clinic. Outpatient blood pressures have not been over 140/90. He does not follow a low salt diet.       No chest pain/sob/palpitations/dizziness/ha's  Tolerating meds.          Review of Systems   Constitutional, HEENT, cardiovascular, pulmonary, GI, , musculoskeletal, neuro, skin, endocrine and psych systems are negative, except as otherwise noted.      Objective    /80   Pulse 85   Temp 98.4  F (36.9  C) (Tympanic)   Resp 16   Wt 120.1 kg (264 lb 12.8 oz)   SpO2 99%   BMI 36.93 kg/m    Body mass index is 36.93 kg/m .  Physical Exam     Eye exam - right eye normal lid, conjunctiva, cornea, pupil and fundus, left eye normal lid, conjunctiva, cornea, pupil and fundus.  Thyroid not palpable, not enlarged, no nodules detected.  CHEST:chest clear to IPPA, no tachypnea, retractions or cyanosis and S1, S2 normal, no murmur, no gallop, rate regular.    . Ilia was seen today for recheck.    Diagnoses and all orders for this visit:    Benign essential hypertension  -     BASIC METABOLIC PANEL; Future  -     losartan (COZAAR) 100 MG tablet; Take 1 tablet (100 mg) by mouth daily    Screen for colon cancer  -     Colonoscopy Screening  Referral; Future    Advance care planning    Stage 3b chronic kidney disease (H)  -     BASIC METABOLIC PANEL; Future    Hyperlipidemia LDL goal <130  -     rosuvastatin (CRESTOR) 40 MG tablet; Take 1 tablet (40 mg) by mouth daily    Acute gouty arthritis  -     allopurinol (ZYLOPRIM) 100 MG tablet; Take 1 tablet (100 mg) by mouth daily      work on lifestyle modification  Recheck in 6 mos   ..

## 2022-09-01 ENCOUNTER — OFFICE VISIT (OUTPATIENT)
Dept: FAMILY MEDICINE | Facility: CLINIC | Age: 47
End: 2022-09-01
Payer: COMMERCIAL

## 2022-09-01 VITALS
WEIGHT: 264.8 LBS | TEMPERATURE: 98.4 F | HEART RATE: 85 BPM | DIASTOLIC BLOOD PRESSURE: 80 MMHG | RESPIRATION RATE: 16 BRPM | SYSTOLIC BLOOD PRESSURE: 118 MMHG | OXYGEN SATURATION: 99 % | BODY MASS INDEX: 36.93 KG/M2

## 2022-09-01 DIAGNOSIS — M10.9 ACUTE GOUTY ARTHRITIS: ICD-10-CM

## 2022-09-01 DIAGNOSIS — Z71.89 ADVANCE CARE PLANNING: ICD-10-CM

## 2022-09-01 DIAGNOSIS — N18.32 STAGE 3B CHRONIC KIDNEY DISEASE (H): ICD-10-CM

## 2022-09-01 DIAGNOSIS — I10 BENIGN ESSENTIAL HYPERTENSION: Primary | ICD-10-CM

## 2022-09-01 DIAGNOSIS — Z12.11 SCREEN FOR COLON CANCER: ICD-10-CM

## 2022-09-01 DIAGNOSIS — E78.5 HYPERLIPIDEMIA LDL GOAL <130: ICD-10-CM

## 2022-09-01 LAB
ANION GAP SERPL CALCULATED.3IONS-SCNC: 5 MMOL/L (ref 3–14)
BUN SERPL-MCNC: 27 MG/DL (ref 7–30)
CALCIUM SERPL-MCNC: 8.8 MG/DL (ref 8.5–10.1)
CHLORIDE BLD-SCNC: 108 MMOL/L (ref 94–109)
CO2 SERPL-SCNC: 29 MMOL/L (ref 20–32)
CREAT SERPL-MCNC: 2.05 MG/DL (ref 0.66–1.25)
GFR SERPL CREATININE-BSD FRML MDRD: 40 ML/MIN/1.73M2
GLUCOSE BLD-MCNC: 85 MG/DL (ref 70–99)
POTASSIUM BLD-SCNC: 4.4 MMOL/L (ref 3.4–5.3)
SODIUM SERPL-SCNC: 142 MMOL/L (ref 133–144)

## 2022-09-01 PROCEDURE — 36415 COLL VENOUS BLD VENIPUNCTURE: CPT | Performed by: PHYSICIAN ASSISTANT

## 2022-09-01 PROCEDURE — 80048 BASIC METABOLIC PNL TOTAL CA: CPT | Performed by: PHYSICIAN ASSISTANT

## 2022-09-01 PROCEDURE — 99213 OFFICE O/P EST LOW 20 MIN: CPT | Performed by: PHYSICIAN ASSISTANT

## 2022-09-01 RX ORDER — ALLOPURINOL 100 MG/1
100 TABLET ORAL DAILY
Qty: 90 TABLET | Refills: 3 | Status: SHIPPED | OUTPATIENT
Start: 2022-09-01 | End: 2023-07-10

## 2022-09-01 RX ORDER — ROSUVASTATIN CALCIUM 40 MG/1
40 TABLET, COATED ORAL DAILY
Qty: 90 TABLET | Refills: 1 | Status: SHIPPED | OUTPATIENT
Start: 2022-09-01 | End: 2023-07-10

## 2022-09-01 RX ORDER — LOSARTAN POTASSIUM 100 MG/1
100 TABLET ORAL DAILY
Qty: 90 TABLET | Refills: 1 | Status: SHIPPED | OUTPATIENT
Start: 2022-09-01 | End: 2023-07-10

## 2022-09-01 ASSESSMENT — PAIN SCALES - GENERAL: PAINLEVEL: NO PAIN (0)

## 2022-10-09 ENCOUNTER — HEALTH MAINTENANCE LETTER (OUTPATIENT)
Age: 47
End: 2022-10-09

## 2022-10-25 DIAGNOSIS — N18.32 STAGE 3B CHRONIC KIDNEY DISEASE (H): Primary | ICD-10-CM

## 2022-11-16 ENCOUNTER — LAB (OUTPATIENT)
Dept: LAB | Facility: CLINIC | Age: 47
End: 2022-11-16
Payer: COMMERCIAL

## 2022-11-16 DIAGNOSIS — N18.32 STAGE 3B CHRONIC KIDNEY DISEASE (H): ICD-10-CM

## 2022-11-16 LAB
ALBUMIN SERPL-MCNC: 3.5 G/DL (ref 3.4–5)
ANION GAP SERPL CALCULATED.3IONS-SCNC: 2 MMOL/L (ref 3–14)
BUN SERPL-MCNC: 16 MG/DL (ref 7–30)
CALCIUM SERPL-MCNC: 8.9 MG/DL (ref 8.5–10.1)
CHLORIDE BLD-SCNC: 111 MMOL/L (ref 94–109)
CO2 SERPL-SCNC: 28 MMOL/L (ref 20–32)
CREAT SERPL-MCNC: 1.81 MG/DL (ref 0.66–1.25)
GFR SERPL CREATININE-BSD FRML MDRD: 46 ML/MIN/1.73M2
GLUCOSE BLD-MCNC: 101 MG/DL (ref 70–99)
HGB BLD-MCNC: 16.2 G/DL (ref 13.3–17.7)
PHOSPHATE SERPL-MCNC: 2.6 MG/DL (ref 2.5–4.5)
POTASSIUM BLD-SCNC: 4.4 MMOL/L (ref 3.4–5.3)
SODIUM SERPL-SCNC: 141 MMOL/L (ref 133–144)

## 2022-11-16 PROCEDURE — 84156 ASSAY OF PROTEIN URINE: CPT

## 2022-11-16 PROCEDURE — 36415 COLL VENOUS BLD VENIPUNCTURE: CPT

## 2022-11-16 PROCEDURE — 85018 HEMOGLOBIN: CPT

## 2022-11-16 PROCEDURE — 80069 RENAL FUNCTION PANEL: CPT

## 2022-11-16 PROCEDURE — 83970 ASSAY OF PARATHORMONE: CPT

## 2022-11-17 LAB
ALBUMIN MFR UR ELPH: 314 MG/DL
CREAT UR-MCNC: 130 MG/DL
PROT/CREAT 24H UR: 2.42 MG/MG CR (ref 0–0.2)
PTH-INTACT SERPL-MCNC: 44 PG/ML (ref 15–65)

## 2022-11-22 ENCOUNTER — VIRTUAL VISIT (OUTPATIENT)
Dept: NEPHROLOGY | Facility: CLINIC | Age: 47
End: 2022-11-22
Payer: COMMERCIAL

## 2022-11-22 ENCOUNTER — TELEPHONE (OUTPATIENT)
Dept: NEPHROLOGY | Facility: CLINIC | Age: 47
End: 2022-11-22

## 2022-11-22 VITALS — WEIGHT: 258 LBS | BODY MASS INDEX: 35.98 KG/M2

## 2022-11-22 DIAGNOSIS — N18.32 STAGE 3B CHRONIC KIDNEY DISEASE (H): Primary | ICD-10-CM

## 2022-11-22 DIAGNOSIS — N18.30 CKD (CHRONIC KIDNEY DISEASE) STAGE 3, GFR 30-59 ML/MIN (H): Primary | ICD-10-CM

## 2022-11-22 PROCEDURE — 99204 OFFICE O/P NEW MOD 45 MIN: CPT | Mod: GT | Performed by: INTERNAL MEDICINE

## 2022-11-22 ASSESSMENT — PAIN SCALES - GENERAL: PAINLEVEL: NO PAIN (0)

## 2022-11-22 NOTE — Clinical Note
Matteo Roy -   Appreciate your help in reaching out to pharmacy liaison about sGLT2 inhibitor options for him.  If started, would get a bmp 2 and 4 weeks after starting along with a UA and urine protein at the 4 week lynne. Garret Main, DO

## 2022-11-22 NOTE — PROGRESS NOTES
Ilia is a 47 year old who is being evaluated via a billable video visit.      How would you like to obtain your AVS? MyChart  If the video visit is dropped, the invitation should be resent by: Text to cell phone: [phone]  Will anyone else be joining your video visit? No      11/22/22     CC: proteinuria/FSGS    HPI: Ilia Arnold is a 47 year old male who presents for follow-up of FSGS.     History from previous visits: At the time of his first visit, his CKD risk factors included hypertension (few years), NSAID use, obesity. He was found to have an elevated creatinine, proteinuria and thus biopsy was pursued on 3/18/13 which showed findings most consistent with FSGS with some mild arteriolar thickening; <10% intersitial fibrosis. Unfortunately no EM was performed due to limited tissue in the setting of slightly difficult biopsy.    Creatinine has been 1.37-2 since 2010; 1.84 now with a correlating GFR of 40. His last UPCR was 0.76 g/g. He is currently taking losartan 25 mg daily. He was lost to follow-up as recommended but presents againtoday. He reports that he had inflammatory related issues this summer - knees and ankles - no rash. He started allopurinol and joint pain has improved. Weight was 277 lbs in august and Is now 253 lbs.     11/22/22: video visit. Nothing new with his health most recently. Difficulty with bursitis. He remains on losartan 100 mg daily and crestor. He remains on allopurinol. BP well controlled. Away from tobacco. etoh once a week. No swelling. No hx of UTIs.      No Known Allergies    allopurinol (ZYLOPRIM) 100 MG tablet, Take 1 tablet (100 mg) by mouth daily  losartan (COZAAR) 100 MG tablet, Take 1 tablet (100 mg) by mouth daily  rosuvastatin (CRESTOR) 40 MG tablet, Take 1 tablet (40 mg) by mouth daily    No current facility-administered medications on file prior to visit.      Exam:  Wt 117 kg (258 lb)   BMI 35.98 kg/m      GENERAL APPEARANCE: alert and no  distress      Results  No visits with results within 1 Day(s) from this visit.   Latest known visit with results is:   Lab on 11/16/2022   Component Date Value Ref Range Status     Sodium 11/16/2022 141  133 - 144 mmol/L Final     Potassium 11/16/2022 4.4  3.4 - 5.3 mmol/L Final     Chloride 11/16/2022 111 (H)  94 - 109 mmol/L Final     Carbon Dioxide (CO2) 11/16/2022 28  20 - 32 mmol/L Final     Anion Gap 11/16/2022 2 (L)  3 - 14 mmol/L Final     Urea Nitrogen 11/16/2022 16  7 - 30 mg/dL Final     Creatinine 11/16/2022 1.81 (H)  0.66 - 1.25 mg/dL Final     Calcium 11/16/2022 8.9  8.5 - 10.1 mg/dL Final     Glucose 11/16/2022 101 (H)  70 - 99 mg/dL Final     Albumin 11/16/2022 3.5  3.4 - 5.0 g/dL Final     Phosphorus 11/16/2022 2.6  2.5 - 4.5 mg/dL Final     GFR Estimate 11/16/2022 46 (L)  >60 mL/min/1.73m2 Final    Effective December 21, 2021 eGFRcr in adults is calculated using the 2021 CKD-EPI creatinine equation which includes age and gender (Ace et al., NEJ, DOI: 10.1056/UFWYrk2692215)     Total Protein Urine mg/dL 11/16/2022 314.0  mg/dL Final    The reference ranges have not been established in urine protein. The results should be integrated into the clinical context for interpretation.     Total Protein UR MG/MG CR 11/16/2022 2.42 (H)  0.00 - 0.20 mg/mg Cr Final     Creatinine Urine mg/dL 11/16/2022 130.0  mg/dL Final    The reference ranges have not been established in urine creatinine. The results should be integrated into the clinical context for interpretation.     Parathyroid Hormone Intact 11/16/2022 44  15 - 65 pg/mL Final     Hemoglobin 11/16/2022 16.2  13.3 - 17.7 g/dL Final          Assessment/Plan:  1. FSGS/CKD Stage 3: findings on biopsy are consistent with FSGS, however, unfortunately EM was not available due to limited sample. I have reviewed his biopsy with the previous renal pathologist and findings suggest a secondary FSGS.  His non-nephrotic presentation lean toward a secondary process  as well. He is on losartan 100 mg daily. I recommended starting a SGLT2 inhibitor today and he is open to this idea. Will send to pharmacy liaison. If started, would repeat labs 2 and 4 weeks after starting to assure stability. Educated on importance of weight loss, good hydration, avoidance of NSAIDs. Will tentatively plan labs in 6 months and follow-up in one year but will be in touch about the SGLT2 inhibitor.     2. Hypertension: at goal of less than 130/80.     3. Obesity: educated on benefits of weight loss given obesity is a risk for secondary FSGS as well.     4. Gout: now on allopurinol.     Patient Instructions   1. Labs in 6 month  2. Follow-up in 1 year.      [id number] AM video visit via [facility] - offsite  Patient is technically a new patient given it has been greater than 3 years since our last visit.   Garret Main, DO

## 2022-11-23 ENCOUNTER — TELEPHONE (OUTPATIENT)
Dept: NEPHROLOGY | Facility: CLINIC | Age: 47
End: 2022-11-23

## 2022-11-23 NOTE — TELEPHONE ENCOUNTER
Left Voicemail (1st Attempt) for the patient to call back and schedule the following:    Appointment type: Virtual Return  Provider: Dr. Main  Return date: 11/22/2023  Specialty phone number: [phone]  Additional appointment(s) needed: yes  Additonal Notes:       Patient Instructions Labs in 6 month Follow-up in 1 year.         Also sent a [employer] message.    Jeanie R/Procedure    [facility]   Neurology, NeuroSurgery, NeuroPsychology and Pain Management Specialties  Medical/Surgical Adult Specialties

## 2022-11-25 ENCOUNTER — TELEPHONE (OUTPATIENT)
Dept: MULTI SPECIALTY CLINIC | Facility: CLINIC | Age: 47
End: 2022-11-25

## 2022-11-25 NOTE — TELEPHONE ENCOUNTER
Ilia was agreeable to starting Jardiance. He verbalized understanding to the instructions to have labs done at 2 and 4 weeks. Those orders are placed.     RONNIE Roy

## 2022-12-07 DIAGNOSIS — N18.32 STAGE 3B CHRONIC KIDNEY DISEASE (H): Primary | ICD-10-CM

## 2023-02-18 ENCOUNTER — HEALTH MAINTENANCE LETTER (OUTPATIENT)
Age: 48
End: 2023-02-18

## 2023-06-15 DIAGNOSIS — N52.9 ERECTILE DYSFUNCTION, UNSPECIFIED ERECTILE DYSFUNCTION TYPE: ICD-10-CM

## 2023-06-15 NOTE — TELEPHONE ENCOUNTER
"Medication was previously ordered by Lazaro yT PA-C on 2/16/22, however it was discontinued by Dr. Main (Nephrology) on 11/22/22.     It is unclear if this was removed from the active medication list on 11/22/22 because \"Patient not taking\" or for a medical reason.     Will route to Nephrology for recommendation.     Patient is also overdue for a 6 month follow up/Yearly Preventive Exam with Lazaro Ty PA-C.     Donna Martins RN BSN  [facility]         "

## 2023-06-15 NOTE — TELEPHONE ENCOUNTER
Hello,    The patient is requesting a refill on Sildenafil 20 mg tablet. I do not see this on his active med list. Please verify and send a new order to the pharmacy if appropriate. Thanks!    Karolyn Otero CPhT  [facility]  Phone: [phone]  Fax: [phone]

## 2023-06-19 RX ORDER — SILDENAFIL CITRATE 20 MG/1
TABLET ORAL
Qty: 30 TABLET | Refills: 0 | Status: SHIPPED | OUTPATIENT
Start: 2023-06-19 | End: 2023-07-10

## 2023-06-19 NOTE — TELEPHONE ENCOUNTER
Pt returned phone call to clinic assisted in scheduling follow-up apt he was not interested in Physical at this time and is still requesting a refill his apt isnt until July 10th.

## 2023-06-19 NOTE — TELEPHONE ENCOUNTER
"It was probably just taken off the med list as reported \"not taking\". I would defer to primary care regarding refills if indicated.     Garret Main, DO   "

## 2023-06-19 NOTE — TELEPHONE ENCOUNTER
Patient was last seen in the clinic by Lazaro Ty on 9/1/22 for a Hypertension visit. Patient was instructed to:      Return in about 6 months (around 3/1/2023) for Physical Exam    Left message on voice mail for patient to call clinic.   [phone]. I told patient that he is due to be seen and we are working on a refill request.     Please assist patient in scheduling this appointment.     Refill request can be sent to Lazaro once patient has scheduled his appointment.     Donna Martins RN BSN  [facility]     Prior Authorization Retail Medication Request    Medication/Dose: cyclobenzaprine 10 mg at bedtime PRN (please submit for 90-day supply)  ICD code (if different than what is on RX):  Myasthenia Gravis without exacerbation G70.00  Previously Tried and Failed:    Rationale:  Continuation of therapy to treat disease and muscle spasms.    Insurance Name:  na  Insurance ID:  na      Pharmacy Information (if different than what is on RX)  Name:  [facility]  Phone:  [phone]

## 2023-07-10 ENCOUNTER — OFFICE VISIT (OUTPATIENT)
Dept: FAMILY MEDICINE | Facility: CLINIC | Age: 48
End: 2023-07-10
Payer: COMMERCIAL

## 2023-07-10 VITALS
HEIGHT: 71 IN | BODY MASS INDEX: 30.35 KG/M2 | TEMPERATURE: 98 F | SYSTOLIC BLOOD PRESSURE: 120 MMHG | OXYGEN SATURATION: 98 % | DIASTOLIC BLOOD PRESSURE: 78 MMHG | HEART RATE: 68 BPM | WEIGHT: 216.8 LBS | RESPIRATION RATE: 20 BRPM

## 2023-07-10 DIAGNOSIS — N52.9 ERECTILE DYSFUNCTION, UNSPECIFIED ERECTILE DYSFUNCTION TYPE: ICD-10-CM

## 2023-07-10 DIAGNOSIS — I10 BENIGN ESSENTIAL HYPERTENSION: Primary | ICD-10-CM

## 2023-07-10 DIAGNOSIS — N18.32 STAGE 3B CHRONIC KIDNEY DISEASE (H): ICD-10-CM

## 2023-07-10 DIAGNOSIS — E78.5 HYPERLIPIDEMIA LDL GOAL <130: ICD-10-CM

## 2023-07-10 DIAGNOSIS — Z12.11 SCREEN FOR COLON CANCER: ICD-10-CM

## 2023-07-10 PROCEDURE — 99213 OFFICE O/P EST LOW 20 MIN: CPT | Performed by: PHYSICIAN ASSISTANT

## 2023-07-10 RX ORDER — LOSARTAN POTASSIUM 100 MG/1
100 TABLET ORAL DAILY
Qty: 90 TABLET | Refills: 1 | Status: SHIPPED | OUTPATIENT
Start: 2023-07-10 | End: 2024-02-24

## 2023-07-10 RX ORDER — ROSUVASTATIN CALCIUM 40 MG/1
40 TABLET, COATED ORAL DAILY
Qty: 90 TABLET | Refills: 1 | Status: SHIPPED | OUTPATIENT
Start: 2023-07-10 | End: 2024-04-02

## 2023-07-10 RX ORDER — SILDENAFIL CITRATE 20 MG/1
TABLET ORAL
Qty: 30 TABLET | Refills: 0 | Status: SHIPPED | OUTPATIENT
Start: 2023-07-10 | End: 2023-11-15

## 2023-07-10 ASSESSMENT — PAIN SCALES - GENERAL: PAINLEVEL: NO PAIN (0)

## 2023-07-10 NOTE — PROGRESS NOTES
"      Tri Morillo is a 47 year old, presenting for the following health issues:  No chief complaint on file.        7/10/2023     3:38 PM   Additional Questions   Roomed by Amira Dodge CMA   Accompanied by None         7/10/2023     3:38 PM   Patient Reported Additional Medications   Patient reports taking the following new medications none     History of Present Illness       Reason for visit:  Meds update    He eats 0-1 servings of fruits and vegetables daily.He consumes 0 sweetened beverage(s) daily.He exercises with enough effort to increase his heart rate 60 or more minutes per day.  He exercises with enough effort to increase his heart rate 6 days per week. He is missing 1 dose(s) of medications per week.       Exercising and has lost 50 plus lbs.  Working out routinely.  No chest pain/sob/palpitations/dizziness/ha's  Home blood pressure numbers running in the 120's/70's       Review of Systems   Constitutional, HEENT, cardiovascular, pulmonary, GI, , musculoskeletal, neuro, skin, endocrine and psych systems are negative, except as otherwise noted.      Objective    /78   Pulse 68   Temp 98  F (36.7  C) (Temporal)   Resp 20   Ht 1.791 m (5' 10.5\")   Wt 98.3 kg (216 lb 12.8 oz)   SpO2 98%   BMI 30.67 kg/m    Body mass index is 30.67 kg/m .  Physical Exam     Eye exam - right eye normal lid, conjunctiva, cornea, pupil and fundus, left eye normal lid, conjunctiva, cornea, pupil and fundus.  Thyroid not palpable, not enlarged, no nodules detected.  CHEST:chest clear to IPPA, no tachypnea, retractions or cyanosis and S1, S2 normal, no murmur, no gallop, rate regular.      Diagnoses and all orders for this visit:    Benign essential hypertension  -     losartan (COZAAR) 100 MG tablet; Take 1 tablet (100 mg) by mouth daily    Screen for colon cancer  -     Colonoscopy Screening  Referral; Future    Hyperlipidemia LDL goal <130  -     rosuvastatin (CRESTOR) 40 MG tablet; Take 1 " tablet (40 mg) by mouth daily    Erectile dysfunction, unspecified erectile dysfunction type  -     sildenafil (REVATIO) 20 MG tablet; TAKE 2 TO 5 TABLETS BY MOUTH DAILY AS NEEDED (MAX 100MG PER 24 HOURS)    Stage 3b chronic kidney disease (H)  -     empagliflozin (JARDIANCE) 10 MG TABS tablet; Take 1 tablet (10 mg) by mouth daily    Other orders  -     REVIEW OF HEALTH MAINTENANCE PROTOCOL ORDERS      Exercise  Lower calorie diet  Recheck in 6 mos

## 2023-11-15 DIAGNOSIS — N52.9 ERECTILE DYSFUNCTION, UNSPECIFIED ERECTILE DYSFUNCTION TYPE: ICD-10-CM

## 2023-11-15 RX ORDER — SILDENAFIL CITRATE 20 MG/1
TABLET ORAL
Qty: 30 TABLET | Refills: 0 | Status: SHIPPED | OUTPATIENT
Start: 2023-11-15 | End: 2024-01-24

## 2024-01-03 ENCOUNTER — TELEPHONE (OUTPATIENT)
Dept: NEPHROLOGY | Facility: CLINIC | Age: 49
End: 2024-01-03
Payer: COMMERCIAL

## 2024-01-03 DIAGNOSIS — N18.32 STAGE 3B CHRONIC KIDNEY DISEASE (H): ICD-10-CM

## 2024-01-03 RX ORDER — EMPAGLIFLOZIN 10 MG/1
10 TABLET, FILM COATED ORAL DAILY
Qty: 90 TABLET | Refills: 3 | OUTPATIENT
Start: 2024-01-03

## 2024-01-03 NOTE — TELEPHONE ENCOUNTER
----- Message from Manuela Tamez RN sent at 1/3/2024  2:48 PM CST -----  Regarding: Etelvina follow up  Hi Team,   Please reach out to patient to schedule Dr. Main follow up and lab work.   Thank you,   RONNIE Roy

## 2024-01-03 NOTE — CONFIDENTIAL NOTE
01/03 Called patient (1st attempt) left voicemail and provided [phone] for patient to call back and schedule follow up visit with  and a lab appointment.       [facility]   Gastroenterology, Infectious Diseases, Nephrology, Pulmonology and Rheumatology Specialties  [facility]

## 2024-01-03 NOTE — TELEPHONE ENCOUNTER
Jardiance refill refused at this time. Pt is overdue for lab work and follow up with Dr. Main at this time. November visit was cancelled and no future visit scheduled at this time.   Writer sending scheduling task to team to assist with Dr. Main follow up.

## 2024-01-12 ENCOUNTER — MYC MEDICAL ADVICE (OUTPATIENT)
Dept: NEPHROLOGY | Facility: CLINIC | Age: 49
End: 2024-01-12
Payer: COMMERCIAL

## 2024-01-12 DIAGNOSIS — N18.32 STAGE 3B CHRONIC KIDNEY DISEASE (H): Primary | ICD-10-CM

## 2024-01-23 DIAGNOSIS — N52.9 ERECTILE DYSFUNCTION, UNSPECIFIED ERECTILE DYSFUNCTION TYPE: ICD-10-CM

## 2024-01-24 RX ORDER — SILDENAFIL CITRATE 20 MG/1
TABLET ORAL
Qty: 30 TABLET | Refills: 0 | Status: SHIPPED | OUTPATIENT
Start: 2024-01-24 | End: 2024-09-18

## 2024-01-26 NOTE — TELEPHONE ENCOUNTER
Attempted to contact pt.  No answer.  Message left on VM to return call.    Calling to assist pt with scheduling a lab appt prior Video Visit with Dr. Main 1/30/24 at 11:30am.  Non fasting lab appt can be scheduled at any [facility].    Gabby Miller LPN    
2

## 2024-03-16 ENCOUNTER — HEALTH MAINTENANCE LETTER (OUTPATIENT)
Age: 49
End: 2024-03-16

## 2024-09-18 ENCOUNTER — ORDERS ONLY (AUTO-RELEASED) (OUTPATIENT)
Dept: FAMILY MEDICINE | Facility: CLINIC | Age: 49
End: 2024-09-18

## 2024-09-18 ENCOUNTER — OFFICE VISIT (OUTPATIENT)
Dept: FAMILY MEDICINE | Facility: CLINIC | Age: 49
End: 2024-09-18

## 2024-09-18 VITALS
WEIGHT: 194.4 LBS | RESPIRATION RATE: 16 BRPM | HEART RATE: 66 BPM | OXYGEN SATURATION: 98 % | HEIGHT: 70 IN | DIASTOLIC BLOOD PRESSURE: 66 MMHG | SYSTOLIC BLOOD PRESSURE: 112 MMHG | BODY MASS INDEX: 27.83 KG/M2 | TEMPERATURE: 98.1 F

## 2024-09-18 DIAGNOSIS — Z12.11 SCREEN FOR COLON CANCER: ICD-10-CM

## 2024-09-18 DIAGNOSIS — E78.5 HYPERLIPIDEMIA LDL GOAL <130: Primary | ICD-10-CM

## 2024-09-18 DIAGNOSIS — N52.9 ERECTILE DYSFUNCTION, UNSPECIFIED ERECTILE DYSFUNCTION TYPE: ICD-10-CM

## 2024-09-18 DIAGNOSIS — I10 BENIGN ESSENTIAL HYPERTENSION: ICD-10-CM

## 2024-09-18 DIAGNOSIS — N18.32 STAGE 3B CHRONIC KIDNEY DISEASE (H): ICD-10-CM

## 2024-09-18 PROCEDURE — 99214 OFFICE O/P EST MOD 30 MIN: CPT | Performed by: PHYSICIAN ASSISTANT

## 2024-09-18 RX ORDER — ROSUVASTATIN CALCIUM 40 MG/1
40 TABLET, COATED ORAL DAILY
Qty: 90 TABLET | Refills: 1 | Status: SHIPPED | OUTPATIENT
Start: 2024-09-18

## 2024-09-18 RX ORDER — SILDENAFIL CITRATE 20 MG/1
TABLET ORAL
Qty: 30 TABLET | Refills: 0 | Status: SHIPPED | OUTPATIENT
Start: 2024-09-18

## 2024-09-18 RX ORDER — LOSARTAN POTASSIUM 100 MG/1
100 TABLET ORAL DAILY
Qty: 90 TABLET | Refills: 1 | Status: SHIPPED | OUTPATIENT
Start: 2024-09-18

## 2024-09-18 NOTE — PROGRESS NOTES
"    Tri Morillo is a 49 year old, presenting for the following health issues:  Recheck Medication      9/18/2024    10:24 AM   Additional Questions   Roomed by Tracy   Accompanied by Self     History of Present Illness       Reason for visit:  Scrip refill    He eats 0-1 servings of fruits and vegetables daily.He consumes 1 sweetened beverage(s) daily.He exercises with enough effort to increase his heart rate 30 to 60 minutes per day.  He exercises with enough effort to increase his heart rate 4 days per week. He is missing 1 dose(s) of medications per week.     Htn well controlled.   Due to check lipids.  Taking and tolerating meds.   No chest pain/sob/palpitations/dizziness/ha's    History of ckd. Historically stable.     Patient here for refill of ED medication today       Review of Systems  Constitutional, HEENT, cardiovascular, pulmonary, GI, , musculoskeletal, neuro, skin, endocrine and psych systems are negative, except as otherwise noted.      Objective    /66   Pulse 66   Temp 98.1  F (36.7  C) (Temporal)   Resp 16   Ht 1.778 m (5' 10\")   Wt 88.2 kg (194 lb 6.4 oz)   SpO2 98%   BMI 27.89 kg/m    Body mass index is 27.89 kg/m .  Physical Exam   Eye exam - right eye normal lid, conjunctiva, cornea, pupil and fundus, left eye normal lid, conjunctiva, cornea, pupil and fundus.  Thyroid not palpable, not enlarged, no nodules detected.  CHEST:chest clear to IPPA, no tachypnea, retractions or cyanosis, and S1, S2 normal, no murmur, no gallop, rate regular.      Ilia was seen today for recheck medication.    Diagnoses and all orders for this visit:    Hyperlipidemia LDL goal <130  -     Lipid panel reflex to direct LDL Fasting; Future  -     rosuvastatin (CRESTOR) 40 MG tablet; Take 1 tablet (40 mg) by mouth daily.    Erectile dysfunction, unspecified erectile dysfunction type  -     sildenafil (REVATIO) 20 MG tablet; TAKE 2 TO 5 TABLETS BY MOUTH DAILY AS NEEDED (MAX 100MG PER 24 " HOURS)    Screen for colon cancer  -     COLOGUARD(doo); Future    Stage 3b chronic kidney disease (H)  -     Albumin Random Urine Quantitative with Creat Ratio; Future  -     Hemoglobin; Future  -     Basic metabolic panel  (Ca, Cl, CO2, Creat, Gluc, K, Na, BUN); Future  -     empagliflozin (JARDIANCE) 10 MG TABS tablet; Take 1 tablet (10 mg) by mouth daily.    Benign essential hypertension  -     Albumin Random Urine Quantitative with Creat Ratio; Future  -     Basic metabolic panel  (Ca, Cl, CO2, Creat, Gluc, K, Na, BUN); Future  -     losartan (COZAAR) 100 MG tablet; Take 1 tablet (100 mg) by mouth daily.    Other orders  -     REVIEW OF HEALTH MAINTENANCE PROTOCOL ORDERS      Continue to work on a lower sugar/starch diet and more exercise.  Lower fat, higher fiber diet and consistent exercise.          Signed Electronically by: Lazaro yT PA-C

## 2024-10-03 ENCOUNTER — LAB (OUTPATIENT)
Dept: LAB | Facility: CLINIC | Age: 49
End: 2024-10-03

## 2024-10-03 DIAGNOSIS — Z11.59 NEED FOR HEPATITIS C SCREENING TEST: Primary | ICD-10-CM

## 2024-10-03 DIAGNOSIS — N18.32 STAGE 3B CHRONIC KIDNEY DISEASE (H): ICD-10-CM

## 2024-10-03 DIAGNOSIS — I10 BENIGN ESSENTIAL HYPERTENSION: ICD-10-CM

## 2024-10-03 DIAGNOSIS — E78.5 HYPERLIPIDEMIA LDL GOAL <130: ICD-10-CM

## 2024-10-03 LAB
ANION GAP SERPL CALCULATED.3IONS-SCNC: 9 MMOL/L (ref 7–15)
BUN SERPL-MCNC: 30.8 MG/DL (ref 6–20)
CALCIUM SERPL-MCNC: 9.2 MG/DL (ref 8.8–10.4)
CHLORIDE SERPL-SCNC: 104 MMOL/L (ref 98–107)
CHOLEST SERPL-MCNC: 212 MG/DL
CREAT SERPL-MCNC: 1.96 MG/DL (ref 0.67–1.17)
CREAT UR-MCNC: 42.3 MG/DL
EGFRCR SERPLBLD CKD-EPI 2021: 41 ML/MIN/1.73M2
FASTING STATUS PATIENT QL REPORTED: YES
FASTING STATUS PATIENT QL REPORTED: YES
GLUCOSE SERPL-MCNC: 88 MG/DL (ref 70–99)
HCO3 SERPL-SCNC: 25 MMOL/L (ref 22–29)
HCV AB SERPL QL IA: NONREACTIVE
HDLC SERPL-MCNC: 53 MG/DL
HGB BLD-MCNC: 16.3 G/DL (ref 13.3–17.7)
LDLC SERPL CALC-MCNC: 118 MG/DL
MICROALBUMIN UR-MCNC: 182 MG/L
MICROALBUMIN/CREAT UR: 430.26 MG/G CR (ref 0–17)
NONHDLC SERPL-MCNC: 159 MG/DL
POTASSIUM SERPL-SCNC: 4.6 MMOL/L (ref 3.4–5.3)
SODIUM SERPL-SCNC: 138 MMOL/L (ref 135–145)
TRIGL SERPL-MCNC: 204 MG/DL

## 2024-10-03 PROCEDURE — 86803 HEPATITIS C AB TEST: CPT

## 2024-10-03 PROCEDURE — 80061 LIPID PANEL: CPT

## 2024-10-03 PROCEDURE — 85018 HEMOGLOBIN: CPT

## 2024-10-03 PROCEDURE — 80048 BASIC METABOLIC PNL TOTAL CA: CPT

## 2024-10-03 PROCEDURE — 82043 UR ALBUMIN QUANTITATIVE: CPT

## 2024-10-03 PROCEDURE — 36415 COLL VENOUS BLD VENIPUNCTURE: CPT

## 2024-10-03 PROCEDURE — 82570 ASSAY OF URINE CREATININE: CPT

## 2024-10-14 ENCOUNTER — TELEPHONE (OUTPATIENT)
Dept: FAMILY MEDICINE | Facility: CLINIC | Age: 49
End: 2024-10-14

## 2025-01-02 ENCOUNTER — TELEPHONE (OUTPATIENT)
Dept: FAMILY MEDICINE | Facility: CLINIC | Age: 50
End: 2025-01-02

## 2025-01-02 NOTE — TELEPHONE ENCOUNTER
Patient Quality Outreach    Patient is due for the following:   Colon Cancer Screening  Depression  -  PHQ2  Physical Preventive Adult Physical    Action(s) Taken:   Schedule a Adult Preventative    Type of outreach:    Sent [employer] message.    Questions for provider review:    None           Amira Dodge

## 2025-03-22 ENCOUNTER — HEALTH MAINTENANCE LETTER (OUTPATIENT)
Age: 50
End: 2025-03-22

## 2025-06-24 ENCOUNTER — MYC REFILL (OUTPATIENT)
Dept: FAMILY MEDICINE | Facility: CLINIC | Age: 50
End: 2025-06-24

## 2025-06-24 DIAGNOSIS — E78.5 HYPERLIPIDEMIA LDL GOAL <130: ICD-10-CM

## 2025-06-24 DIAGNOSIS — I10 BENIGN ESSENTIAL HYPERTENSION: ICD-10-CM

## 2025-06-24 DIAGNOSIS — N18.32 STAGE 3B CHRONIC KIDNEY DISEASE (H): ICD-10-CM

## 2025-06-24 RX ORDER — ROSUVASTATIN CALCIUM 40 MG/1
40 TABLET, COATED ORAL DAILY
Qty: 90 TABLET | Refills: 1 | OUTPATIENT
Start: 2025-06-24

## 2025-06-25 RX ORDER — LOSARTAN POTASSIUM 100 MG/1
100 TABLET ORAL DAILY
Qty: 30 TABLET | Refills: 0 | Status: SHIPPED | OUTPATIENT
Start: 2025-06-25

## 2025-06-25 NOTE — TELEPHONE ENCOUNTER
Spoke with patient. Informed him of below. Patient states he needs to check his insurance as he no longer believes we are covered for him. He will call back to schedule. Yuko Dennison MA